# Patient Record
Sex: FEMALE | Race: WHITE | Employment: FULL TIME | URBAN - METROPOLITAN AREA
[De-identification: names, ages, dates, MRNs, and addresses within clinical notes are randomized per-mention and may not be internally consistent; named-entity substitution may affect disease eponyms.]

---

## 2020-06-03 ENCOUNTER — TELEPHONE (OUTPATIENT)
Dept: FAMILY MEDICINE CLINIC | Age: 45
End: 2020-06-03

## 2020-06-03 NOTE — TELEPHONE ENCOUNTER
Ms. Pieter Tucker is scheduled as a new patient for you Monday, June 8th, 2020. She has been moved a few times and held off with back pain that is progressively worse. She is preferring an in office visit as soon as possible. She doesn't feel a virtual visit with suffice. She has been seeing a chiropractor for the past three weeks but still needs medical care from pcp. She doesn't want to wait any longer. Please review and advise.

## 2020-06-03 NOTE — TELEPHONE ENCOUNTER
I appreciate her frustration. COVID has been challenging for us all. I'm afraid I cannot accommodate all elements of her request. I will not have in office appt availability any sooner than the last week of June.  Options are:    Virtual visit, which I can accommodate Friday  In person with me as above  In person with someone else sooner    NELI

## 2020-06-05 NOTE — TELEPHONE ENCOUNTER
Noted thanks. If there are elements of her care with the chiropractor she'd like me to review, perhaps we can facilitate records request between now and then.  NELI

## 2020-06-05 NOTE — TELEPHONE ENCOUNTER
Pt agreed to virtual for now.        Date/time Tuesday, June 16, 2020 03:30 PM  Patient  Marianne Carballo [birth year] (87WU F) #[id number]  [facility] OFFICE-PCP  Appt type Virtual Visit Special Case [phone]  Provider Delmy Castañeda

## 2020-06-05 NOTE — TELEPHONE ENCOUNTER
I called her. She is making the calls to get them sent here or printed for herself to drop off so you have time to review before appointment :)  Xray has been done too, so radiology reports should be included. English

## 2020-06-16 ENCOUNTER — VIRTUAL VISIT (OUTPATIENT)
Dept: FAMILY MEDICINE CLINIC | Age: 45
End: 2020-06-16

## 2020-06-16 DIAGNOSIS — E66.9 OBESITY, UNSPECIFIED CLASSIFICATION, UNSPECIFIED OBESITY TYPE, UNSPECIFIED WHETHER SERIOUS COMORBIDITY PRESENT: ICD-10-CM

## 2020-06-16 DIAGNOSIS — M47.26 OSTEOARTHRITIS OF SPINE WITH RADICULOPATHY, LUMBAR REGION: Primary | ICD-10-CM

## 2020-06-16 RX ORDER — MELATONIN 5 MG
5 CAPSULE ORAL
COMMUNITY
End: 2021-11-29

## 2020-06-16 RX ORDER — BISMUTH SUBSALICYLATE 262 MG
1 TABLET,CHEWABLE ORAL DAILY
COMMUNITY

## 2020-06-16 RX ORDER — MELOXICAM 15 MG/1
15 TABLET ORAL DAILY
Qty: 30 TAB | Refills: 1 | Status: SHIPPED | OUTPATIENT
Start: 2020-06-16 | End: 2020-09-24 | Stop reason: SDUPTHER

## 2020-06-16 NOTE — PROGRESS NOTES
Javier Hinkle is a 41 y.o. female who was seen by synchronous (real-time) audio-video technology on 6/16/2020. Consent: Javier Hinkle, who was seen by synchronous (real-time) audio-video technology, and/or her healthcare decision maker, is aware that this patient-initiated, Telehealth encounter on 6/16/2020 is a billable service, with coverage as determined by her insurance carrier. She is aware that she may receive a bill and has provided verbal consent to proceed: Yes. Assessment & Plan:     Diagnoses and all orders for this visit:    1. Osteoarthritis of spine with radiculopathy, lumbar region  -     meloxicam (MOBIC) 15 mg tablet; Take 1 Tab by mouth daily. 2. Obesity, unspecified classification, unspecified obesity type, unspecified whether serious comorbidity present      Not interested in procedural/surgical options  Discussed medication options, imaging, PT, continued observation given improvement of the past week. Agree on scheduled NSAIDs, reassess  Plan eval obesity to follow    Follow-up and Dispositions    · Return in about 1 month (around 7/16/2020) for back pain follow up. I spent at least 45 minutes on this visit with this new patient. [id number]  Subjective:   Javier Hinkle is a 41 y.o. female who was seen for LOW BACK PAIN    complains of left low back pain with rad'n to left hip/prox thigh. Initial onset approx April 2019. \"I thought I had tweaked it when we moved. \" episodic flares triggered by activity since then, overall relatively mild. Significantly worse, rapid decline with Teleworking. Had been walking 5-6 miles/day-> 1 mile. Sought chiropractic care x 3 weeks. Discharged with recommendation to seek medical care due to lack of progress. Notes from [address] [place] [address] Esme Payne DC (chirpactor's office) reviewed:  several visits May-June: left side low back pain with rad'n to left hip and thigh off and on x 1 year -> constant.  Worse with activity and prolonged sitting + left SLR, lumbar paraspinal tenderness and \"hypertonicity\". normal strength. Films show narrowing L5-S1, mild degen changes L4-L5    Manipulation and TENS, HEP. Modest improvement in the past week. Now walking about 2.5 miles/day without significant pain. No therapeutic trial NSAIDs (aborted when no relief with 1-2 days)    10year old son with allergies. Just found out she's to go back to work next week. Worries about putting him back in . Working on continued [employer] options. history of depression 15-20 years ago - not recurrent  No history of GI bleed    Would like to lose 60 lb - frustrated    Prior to Admission medications    Medication Sig Start Date End Date Taking? Authorizing Provider   multivitamin (ONE A DAY) tablet Take 1 Tab by mouth daily. Yes Provider, Historical   melatonin 5 mg cap capsule Take 5 mg by mouth nightly. Yes Provider, Historical     Allergies not on file        Review of Systems   Constitutional: Negative for fever and weight loss. Respiratory: Negative for shortness of breath. Cardiovascular: Negative for chest pain. Neurological: Negative for sensory change and focal weakness. No loss bowel or bladder control         Objective: There were no vitals taken for this visit. General: alert, cooperative, no distress, obese   Mental  status: normal mood, behavior, speech, dress, motor activity, and thought processes, able to follow commands   HENT: NCAT   Neck: no visualized mass   Resp: no respiratory distress   Neuro: no gross deficits   Skin: no discoloration or lesions of concern on visible areas   Psychiatric: normal affect, consistent with stated mood, no evidence of hallucinations     Additional exam findings: We discussed the expected course, resolution and complications of the diagnosis(es) in detail. Medication risks, benefits, costs, interactions, and alternatives were discussed as indicated.   I advised her to contact the office if her condition worsens, changes or fails to improve as anticipated. She expressed understanding with the diagnosis(es) and plan. Travon Skelton is a 41 y.o. female who was evaluated by a video visit encounter for concerns as above. Patient identification was verified prior to start of the visit. A caregiver was present when appropriate. Due to this being a TeleHealth encounter (During [facility] public health emergency), evaluation of the following organ systems was limited: Vitals/Constitutional/EENT/Resp/CV/GI//MS/Neuro/Skin/Heme-Lymph-Imm. Pursuant to the emergency declaration under the [facility], 1135 waiver authority and the [employer] Act, this Virtual  Visit was conducted, with patient's (and/or legal guardian's) consent, to reduce the patient's risk of exposure to COVID-19 and provide necessary medical care. Services were provided through a video synchronous discussion virtually to substitute for in-person clinic visit. Patient and provider were located at their individual homes.       Brock Barraza MD

## 2020-06-16 NOTE — PROGRESS NOTES
3:04 PM left message for patient asking her to call the office back as soon as possible to review her health information before her 3:30 appt with Dr. Polo Ovalle, office number left. Pain= back, left 4    New patient being seen today to establish care she c/o low left back pain x 1 year, she initially thought she pulled something while moving but says her pain has not resolved. She says since she has been working from home her back pain has gotten worse and seems to radiate to her left hip. She did 8 sessions with chiropractor who recommended she get this evaluated further. She has tried OTC Ibuprofen but says this did not work at all. Medication reconciliation has been completed with patient. Care team discussed/updated as well as pharmacy.     Health Maintenance Due   Topic Date Due    DTaP/Tdap/Td series (1 - Tdap) 04/14/1997    PAP AKA CERVICAL CYTOLOGY  04/14/1997    Lipid Screen  04/14/2016

## 2020-09-18 ENCOUNTER — TELEPHONE (OUTPATIENT)
Dept: FAMILY MEDICINE CLINIC | Age: 45
End: 2020-09-18

## 2020-09-18 DIAGNOSIS — M47.26 OSTEOARTHRITIS OF SPINE WITH RADICULOPATHY, LUMBAR REGION: ICD-10-CM

## 2020-09-18 NOTE — TELEPHONE ENCOUNTER
MsConsuelo Esperanza Kelley is calling stating that her back pain is still present. Medication helps but when she doesn't have it, it hurts. She also wanted to discuss scheduling routine care. Please call at [phone].

## 2020-09-18 NOTE — TELEPHONE ENCOUNTER
Patient was to follow up in one month for her back pain which she did not follow through, called patient no answer left message asking her to call the office back.

## 2020-09-24 RX ORDER — MELOXICAM 15 MG/1
15 TABLET ORAL DAILY
Qty: 30 TAB | Refills: 0 | Status: SHIPPED | OUTPATIENT
Start: 2020-09-24 | End: 2021-04-09 | Stop reason: ALTCHOICE

## 2020-09-24 NOTE — TELEPHONE ENCOUNTER
Was given written message from [facility] to call patient back, called patient  verified she has been scheduled for her back pain follow up on 10/6/20 at 1:30 PM. She is asking if the Mobic can be sent in for her, says she is out. Medication has been pended please review and sign if appropriate.

## 2020-10-06 ENCOUNTER — VIRTUAL VISIT (OUTPATIENT)
Dept: FAMILY MEDICINE CLINIC | Age: 45
End: 2020-10-06
Payer: COMMERCIAL

## 2020-10-06 DIAGNOSIS — F41.9 ANXIETY: ICD-10-CM

## 2020-10-06 DIAGNOSIS — M47.26 OSTEOARTHRITIS OF SPINE WITH RADICULOPATHY, LUMBAR REGION: Primary | ICD-10-CM

## 2020-10-06 DIAGNOSIS — J98.01 BRONCHOSPASM: ICD-10-CM

## 2020-10-06 DIAGNOSIS — F33.9 EPISODE OF RECURRENT MAJOR DEPRESSIVE DISORDER, UNSPECIFIED DEPRESSION EPISODE SEVERITY (HCC): ICD-10-CM

## 2020-10-06 PROCEDURE — 99214 OFFICE O/P EST MOD 30 MIN: CPT | Performed by: FAMILY MEDICINE

## 2020-10-06 RX ORDER — MINERAL OIL
180 ENEMA (ML) RECTAL DAILY
COMMUNITY

## 2020-10-06 RX ORDER — ALBUTEROL SULFATE 90 UG/1
2 AEROSOL, METERED RESPIRATORY (INHALATION)
Qty: 1 INHALER | Refills: 0 | Status: SHIPPED | OUTPATIENT
Start: 2020-10-06 | End: 2021-06-22 | Stop reason: SDUPTHER

## 2020-10-06 RX ORDER — CITALOPRAM 10 MG/1
10 TABLET ORAL DAILY
Qty: 30 TAB | Refills: 2 | Status: SHIPPED | OUTPATIENT
Start: 2020-10-06 | End: 2020-10-20 | Stop reason: SDUPTHER

## 2020-10-06 NOTE — PROGRESS NOTES
Patient being seen for VV back pain follow up. She says the Meloxicam does help but if she stops taking this her back pain will return in about 2 days. She says she has been having tightness in her chest for weeks not due to stress at work she was seen at [facility] for this prescribed Prilosec 20 mg she had taken this but it did not help so she stopped it. 1. Have you been to the ER, urgent care clinic since your last visit? Hospitalized since your last visit? Yes When: 10/3/20 [facility]: [facility] [facility] Reason for visit: chest pain  2. Have you seen or consulted any other health care providers outside of the [address] [place] [address] since your last visit? Include any pap smears or colon screening. No    Medication reconciliation has been completed with patient. Care team discussed/updated as well as pharmacy.     Health Maintenance Due   Topic Date Due    DTaP/Tdap/Td series (1 - Tdap) 04/14/1997    PAP AKA CERVICAL CYTOLOGY  04/14/1997    Lipid Screen  04/14/2016    Flu Vaccine (1) 09/01/2020

## 2020-10-06 NOTE — PATIENT INSTRUCTIONS
Using a Metered-Dose Inhaler: Care Instructions Your Care Instructions A metered-dose inhaler lets you breathe medicine into your lungs quickly. Inhaled medicine works faster than the same medicine in a pill. An inhaler allows you to take less medicine than you would need if you took it as a pill. \"Metered-dose\" means that the inhaler gives a measured amount of medicine each time you use it. A metered-dose inhaler gives medicine in the form of a liquid mist. 
Your doctor may want you to use a spacer with your inhaler. A spacer is a chamber that you attach to the inhaler. The chamber holds the medicine before you inhale it. That way, you can inhale the medicine in as many breaths as you need. Doctors recommend using a spacer with most metered-dose inhalers, especially those with corticosteroid medicines. Follow-up care is a key part of your treatment and safety. Be sure to make and go to all appointments, and call your doctor if you are having problems. It's also a good idea to know your test results and keep a list of the medicines you take. How can you care for yourself at home? To get started · Talk with your health care provider to be sure you are using your inhaler the right way. It might help if you practice using it in front of a mirror. Use the inhaler exactly as prescribed. · Check that you have the correct medicine. If you use more than one inhaler, put a label on each one. This will let you know which one to use at the right time. · Keep track of how much medicine is in the inhaler. Check the label to see how many doses are in the container. If you know how many puffs you can take, you can replace the inhaler before you run out. Ask your health care provider how you can keep track of how much medicine is left. · Talk to your health care provider about using a spacer with your inhaler. Spacers make it easier to get the medicine into your lungs.  You may need a spacer if you are using corticosteroid medicines. A spacer can also help if you have problems pressing the inhaler and breathing in at the same time. · If you are using a corticosteroid inhaler, gargle and rinse out your mouth with water after use. Do not swallow the water. Swallowing the water will increase the chance that the medicine will get into your bloodstream. This may make it more likely that you will have side effects. To use a spacer with an inhaler 1. Shake the inhaler. Remove the inhaler cap, and place the mouthpiece of the inhaler into the spacer. Check the inhaler instructions to see if you need to prime your inhaler before you use it. If it needs priming, follow the instructions on how to prime your inhaler. 2. Remove the cap from the spacer. 3. Hold the inhaler upright with the mouthpiece at the bottom. 4. Tilt your head back a little, and breathe out slowly and completely. 5. Place the spacer's mouthpiece in your mouth. 6. Press down on the inhaler to spray one puff of medicine into the spacer, and then start breathing in slowly. Wait to inhale until after you have pressed down on the inhaler. Some spacers have a whistle. If you hear it, you should breathe in more slowly. 7. Hold your breath for 10 seconds. This will let the medicine settle in your lungs. 8. If you need to take a second dose, wait 30 to 60 seconds to allow the inhaler valve to refill. To use an inhaler without a spacer 1. Shake the inhaler as directed. Remove the cap. Check the instructions to see if you need to prime your inhaler before you use it. If it needs priming, follow the instructions on how to prime your inhaler. 2. Hold the inhaler upright with the mouthpiece at the bottom. 3. Tilt your head back a little, and breathe out slowly and completely. 4. Position the inhaler in one of two ways: 
? You can place the inhaler in your mouth. This is easier for most people. And it lowers the risk that any of the medicine will get into your eyes. ? Or you can place the inhaler 1 to 2 inches in front of your open mouth, without closing your lips over it. Try to open your mouth as wide as you can. Placing the inhaler in front of your open mouth may be better for getting the medicine into your lungs. But some people may find this too hard to do. 5. Start taking slow, even breaths through your mouth. Press down on the inhaler once, then inhale fully. 6. Hold your breath for 10 seconds. This will let the medicine settle in your lungs. 7. If you need to take a second dose, wait 30 to 60 seconds to allow the inhaler valve to refill. Where can you learn more? Go to http://www.montoya.com/ Enter K111 in the search box to learn more about \"Using a Metered-Dose Inhaler: Care Instructions. \" Current as of: February 24, 2020               Content Version: 12.6 © 2006-2020 [employer]. Care instructions adapted under license by [employer] (which disclaims liability or warranty for this information). If you have questions about a medical condition or this instruction, always ask your healthcare professional. [place] [address] any warranty or liability for your use of this information.

## 2020-10-06 NOTE — PROGRESS NOTES
Kim Wu is a 41 y.o. female who was seen by synchronous (real-time) audio-video technology on 10/6/2020 for Back Pain and Chest Pain        Assessment & Plan:   Diagnoses and all orders for this visit:    1. Osteoarthritis of spine with radiculopathy, lumbar region  -     REFERRAL TO PHYSICAL THERAPY    2. Bronchospasm  -     albuterol (PROVENTIL HFA, VENTOLIN HFA, PROAIR HFA) 90 mcg/actuation inhaler; Take 2 Puffs by inhalation every four (4) hours as needed for Wheezing.  -     inhalational spacing device; 1 Each by Does Not Apply route as needed (chest tightness, shortness of breath). 3. Anxiety  -     citalopram (CELEXA) 10 mg tablet; Take 1 Tab by mouth daily. 4. Episode of recurrent major depressive disorder, unspecified depression episode severity (HCC)  -     citalopram (CELEXA) 10 mg tablet; Take 1 Tab by mouth daily. All uncontrolled  Low risk self harm      712  Subjective:   Seen 6/16 for left low back pain with rad'n to left hip/prox thigh. Films through chiropractor. Assessed OA spine with radiculopathy. Opted for NSAIDs alone. Relief while taking it. Returns without it. Still left low bac. Not radiating into hip and thigh since shifted to bike riding    Also complains of chest tightness. Seen UC. Was told it might be an esophageal spasm. No change with prescribed PPI. 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 Off and on x years, currently active x a month. Often able to relieve with self calming in the past. Prescribed hydroxyzine in the past to good effect albeit sedating. Celexa \"sounds familiar\"  Tearful describing stressors and past episodes years ago  \"I have my moments. \"    Anxiety and depression 18 years ago    Seasonal allergies in the fall. Controlled with antihistamines.     5 mile bike ride today  Sometimes has to stop at the top of the stairs to catch her breath    Prior to Admission medications    Medication Sig Start Date End Date Taking? Authorizing Provider   fexofenadine (ALLEGRA) 180 mg tablet Take 180 mg by mouth daily. Yes Provider, Historical   meloxicam (MOBIC) 15 mg tablet Take 1 Tab by mouth daily. 9/24/20  Yes Ronit Renteria MD   multivitamin (ONE A DAY) tablet Take 1 Tab by mouth daily. Yes Provider, Historical   melatonin 5 mg cap capsule Take 5 mg by mouth nightly. Provider, Historical         ROS    Objective:   No flowsheet data found. General: alert, cooperative, no distress   Mental  status: normal mood, behavior, speech, dress, motor activity, and thought processes, able to follow commands   HENT: NCAT   Neck: no visualized mass   Resp: no respiratory distress   Neuro: no gross deficits   Skin: no discoloration or lesions of concern on visible areas   Psychiatric: Tearful at times, broad affect, no evidence of hallucinations     Additional exam findings: We discussed the expected course, resolution and complications of the diagnosis(es) in detail. Medication risks, benefits, costs, interactions, and alternatives were discussed as indicated. I advised her to contact the office if her condition worsens, changes or fails to improve as anticipated. She expressed understanding with the diagnosis(es) and plan. Giulia Torres, who was evaluated through a patient-initiated, synchronous (real-time) audio-video encounter, and/or her healthcare decision maker, is aware that it is a billable service, with coverage as determined by her insurance carrier. She provided verbal consent to proceed: n/a- consent obtained within past 12 months, and patient identification was verified.  It was conducted pursuant to the emergency declaration under the [facility], 1135 waiver authority and the [employer] Supplemental Appropriations Act. A caregiver was present when appropriate. Ability to conduct physical exam was limited. I was at home. The patient was in her parked car.       Tyler Shah MD

## 2020-10-13 ENCOUNTER — HOSPITAL ENCOUNTER (OUTPATIENT)
Dept: PHYSICAL THERAPY | Age: 45
Discharge: HOME | End: 2020-10-13
Payer: COMMERCIAL

## 2020-10-13 PROCEDURE — 97110 THERAPEUTIC EXERCISES: CPT

## 2020-10-13 PROCEDURE — 97140 MANUAL THERAPY 1/> REGIONS: CPT

## 2020-10-13 PROCEDURE — 97162 PT EVAL MOD COMPLEX 30 MIN: CPT

## 2020-10-13 NOTE — PROGRESS NOTES
[facility]  [address]  [place], [address]  [phone] fax    Plan of Care/ Statement of Necessity for Physical Therapy Services    Patient name: Darren Camejo Start of Care: 10/13/2020   Referral source: Arabella Jensen MD : 1975    Medical Diagnosis: Low back pain [M54.5]  Payor: Ivania Sanchez / Plan: Jonnathan Barbosa HMO / Product Type: HMO /  Onset Date:2019 with exacerbation May 2020    Treatment Diagnosis: LBP   Prior Hospitalization: see medical history Provider#: [id number]   Medications: Verified on Patient summary List    Comorbidities: depression, anxiety   Prior Level of Function: functionally I with all actvities     The Plan of Care and following information is based on the information from the initial evaluation. Assessment/ key information: 42 y/o female presents with c/o LBP and pain into the left hip region that initially started in  after moving but was intermittent in nature. She reports in May of 2020 her pain became consistent and has not shown improvement. She reports receiving chiropractic care without long term benefit. She does report relief with medication but pain returns if she is not taking her meds. The pt demonstrates limited lumbar extension with pain but has not pain with other planes of AROM. Evaluation Complexity History MEDIUM  Complexity : 1-2 comorbidities / personal factors will impact the outcome/ POC ; Examination MEDIUM Complexity : 3 Standardized tests and measures addressing body structure, function, activity limitation and / or participation in recreation  ;Presentation MEDIUM Complexity : Evolving with changing characteristics  ; Clinical Decision Making MEDIUM Complexity : FOTO score of 26-74  Overall Complexity Rating: MEDIUM  Problem List: pain affecting function, decrease ROM, decrease strength, decrease ADL/ functional abilitiies, decrease activity tolerance and decrease flexibility/ joint mobility   Treatment Plan may include any combination of the following: Therapeutic exercise, Therapeutic activities, Neuromuscular re-education, Physical agent/modality, Manual therapy, Patient education, Self Care training and Other: Dry Needle PRN  Patient / Family readiness to learn indicated by: asking questions, trying to perform skills and interest  Persons(s) to be included in education: patient (P)  Barriers to Learning/Limitations: None  Patient Goal (s): To decrease the pain  Patient Self Reported Health Status: good  Rehabilitation Potential: good  Short Term Goals: To be accomplished in 1 weeks:   1. The pt will be I and compliant with HEP     Long Term Goals: To be accomplished in 4 weeks:   1. Improve FOTO score to 60 for improved ability for daily activities. 2. The pt will demonstrate 4+/5 left glute max MMT without pain increase to improve ability for daily tasks   3. The pt will report no difficulty with performing her usual work. 4. The pt will report no difficulty with going from sitting to standing. 5. The pt will demonstrate 4+/5 glute med MMT on the left to improve ability for gait. Frequency / Duration: Patient to be seen 2 times per week for 4 weeks. Patient/ Caregiver education and instruction: Diagnosis, prognosis, self care, activity modification and exercises   [x]  Plan of care has been reviewed with PTA Valli Favre, PT 10/13/2020 10:44 AM    ________________________________________________________________________    I certify that the above Therapy Services are being furnished while the patient is under my care. I agree with the treatment plan and certify that this therapy is necessary.     Physician's Signature:____________Date:_________TIME:________    [employer], Date and Time must be completed for valid certification **    Please sign and return to [facility] [address]  [address]  [place], [address]  [phone] fax

## 2020-10-13 NOTE — PROGRESS NOTES
PT DAILY TREATMENT NOTE 10-18    Patient Name: Aruna Finney  Date:10/13/2020  : 1975  [x]  Patient  Verified  Payor: Damaso Francois / Plan: [employer] / Product Type: HMO /    In time:10:05  Out time:10:41  Total Treatment Time (min): 36  Visit #: 1 of 8      Treatment Area: Low back pain [M54.5]    SUBJECTIVE  Pain Level (0-10 scale): 5  Any medication changes, allergies to medications, adverse drug reactions, diagnosis change, or new procedure performed?: [x] No    [] Yes (see summary sheet for update)  Subjective functional status/changes:   [] No changes reported       OBJECTIVE    Modality rationale:    Min Type Additional Details    [] Estim:  []Unatt       []IFC  []Premod                        []Other:  []w/ice   []w/heat  Position:  Location:    [] Estim: []Att    []TENS instruct  []NMES                    []Other:  []w/US   []w/ice   []w/heat  Position:  Location:    []  Traction: [] Cervical       []Lumbar                       [] Prone          []Supine                       []Intermittent   []Continuous Lbs:  [] before manual  [] after manual    []  Ultrasound: []Continuous   [] Pulsed                           []1MHz   []3MHz W/cm2:  Location:    []  Iontophoresis with dexamethasone         Location: [] Take home patch   [] In clinic    []  Ice     []  heat  []  Ice massage  []  Laser   []  Anodyne Position:  Location:    []  Laser with stim  []  Other:  Position:  Location:    []  Vasopneumatic Device Pressure:       [] lo [] med [] hi   Temperature: [] lo [] med [] hi   [] Skin assessment post-treatment:  []intact []redness- no adverse reaction    []redness  adverse reaction:     18 min [x]Eval                  []Re-Eval       10 min Therapeutic Exercise:  [] See flow sheet : HEP    Rationale: increase ROM and increase strength to improve the patients ability to perform ADL      8 min Manual Therapy:  TPR to left lumbar paraspinals, alignment check   Rationale: decrease pain, increase tissue extensibility and decrease trigger points to perform ADL            With   [] TE   [] TA   [] neuro   [] other: Patient Education: [x] Review HEP    [] Progressed/Changed HEP based on:   [] positioning   [] body mechanics   [] transfers   [] heat/ice application    [] other:      Other Objective/Functional Measures:       Pain Level (0-10 scale) post treatment: 5    ASSESSMENT/Changes in Function:      Patient will continue to benefit from skilled PT services to modify and progress therapeutic interventions, address functional mobility deficits, address ROM deficits, address strength deficits, analyze and address soft tissue restrictions, analyze and cue movement patterns, analyze and modify body mechanics/ergonomics and assess and modify postural abnormalities to attain remaining goals. [x]  See Plan of Care  []  See progress note/recertification  []  See Discharge Summary         Progress towards goals / Updated goals:  Short Term Goals: To be accomplished in 1 weeks:   1. The pt will be I and compliant with HEP   IE- issued and instructed in HEP  Long Term Goals: To be accomplished in 4 weeks:   1. Improve FOTO score to 60 for improved ability for daily activities. IE- 50   2. The pt will demonstrate 4+/5 left glute max MMT without pain increase to improve ability for daily tasks   IE- 4-/5   3. The pt will report no difficulty with performing her usual work. IE- Quite a bit of difficulty   4. The pt will report no difficulty with going from sitting to standing. IE- Quite a bit of difficulty. 5. The pt will demonstrate 4+/5 glute med MMT on the left to improve ability for gait.     IE- 4-/5         PLAN  []  Upgrade activities as tolerated     [x]  Continue plan of care  []  Update interventions per flow sheet       []  Discharge due to:_  []  Other:_      Ne Wise, PT 10/13/2020  10:38 AM    Future Appointments   Date Time Provider Faye Damon   10/20/2020  4:00 PM Tal Moreno MD [facility]   10/23/2020 11:30 AM Vincenzo Neves MD [facility] AM[facility]

## 2020-10-19 ENCOUNTER — HOSPITAL ENCOUNTER (OUTPATIENT)
Dept: PHYSICAL THERAPY | Age: 45
Discharge: HOME | End: 2020-10-19
Payer: COMMERCIAL

## 2020-10-19 PROCEDURE — 97112 NEUROMUSCULAR REEDUCATION: CPT

## 2020-10-19 PROCEDURE — 97110 THERAPEUTIC EXERCISES: CPT

## 2020-10-19 PROCEDURE — 97140 MANUAL THERAPY 1/> REGIONS: CPT

## 2020-10-19 NOTE — PROGRESS NOTES
PT DAILY TREATMENT NOTE 10-18    Patient Name: Nay Thomas  Date:10/19/2020  : 1975  [x]  Patient  Verified  Payor: Rufus Rosario / Plan: [address] [place] [address] [place] HMO / Product Type: HMO /    In time:12:00  Out time:12:38  Total Treatment Time (min): 38  Visit #: 2 of 8    Medicare/BCBS Only   Total Timed Codes (min):  38 1:1 Treatment Time:  38       Treatment Area: Low back pain [M54.5]    SUBJECTIVE  Pain Level (0-10 scale): 7/10  Any medication changes, allergies to medications, adverse drug reactions, diagnosis change, or new procedure performed?: [x] No    [] Yes (see summary sheet for update)  Subjective functional status/changes:   [] No changes reported  Pt reports increased pain due to going to the grocery store prior to today's treatment. OBJECTIVE    20 min Therapeutic Exercise:  [x] See flow sheet :   Rationale: increase ROM and increase strength to improve the patients ability to perform ADLs with increased ease. 10 min Neuromuscular Re-education:  [x]  See flow sheet :   Rationale: increase strength, improve coordination and increase proprioception  to improve the patients ability to perform ADLs. 8 min Manual Therapy:  DTM to left glute/piriformis and left QL. Rationale: decrease pain, increase ROM and increase tissue extensibility to improve tolerance to ADLs. With   [] TE   [] TA   [] neuro   [] other: Patient Education: [x] Review HEP    [] Progressed/Changed HEP based on:   [] positioning   [] body mechanics   [] transfers   [] heat/ice application    [] other:      Other Objective/Functional Measures: Initiated exercises as per flow  Sheet. Pain Level (0-10 scale) post treatment: 0/10    ASSESSMENT/Changes in Function: Pt demonstrates good form with all therapeutic exercises. Pt has decreased symptoms after performing therapeutic exercises and manual treatment.      Patient will continue to benefit from skilled PT services to modify and progress therapeutic interventions, address functional mobility deficits, address ROM deficits, address strength deficits, analyze and address soft tissue restrictions, analyze and cue movement patterns and analyze and modify body mechanics/ergonomics to attain remaining goals. []  See Plan of Care  []  See progress note/recertification  []  See Discharge Summary         Progress towards goals / Updated goals:  Short Term Goals: To be accomplished in 1 weeks:              1. The pt will be I and compliant with HEP              IE- issued and instructed in HEP   Current: met per patient report. 10/19/2020  Long Term Goals: To be accomplished in 4 weeks:              1. Improve FOTO score to 60 for improved ability for daily activities. IE- 50              2. The pt will demonstrate 4+/5 left glute max MMT without pain increase to improve ability for daily tasks              IE- 4-/5              3. The pt will report no difficulty with performing her usual work. IE- Quite a bit of difficulty              4. The pt will report no difficulty with going from sitting to standing. IE- Quite a bit of difficulty. 5. The pt will demonstrate 4+/5 glute med MMT on the left to improve ability for gait.                IE- 4-/5     PLAN  []  Upgrade activities as tolerated     [x]  Continue plan of care  []  Update interventions per flow sheet       []  Discharge due to:_  []  Other:_      Michael Edwards PTA 10/19/2020  12:02 PM    Future Appointments   Date Time Provider Faye Damon   10/20/2020  4:00 PM Patsy Adame MD [facility] AM[facility]   10/22/2020 11:15 AM Maria Elena Wagner, PTA [facility]   10/23/2020 11:30 AM Patsy Adame MD [facility] AM[facility]   10/26/2020 10:30 AM Maria Elena Wagner, PTA [facility]   10/29/2020 11:30 AM Negrita Palumbo, PT [facility]   11/2/2020 11:15 AM Maria Elena Wagner, PTA [facility]   11/5/2020 10:00 AM Ngerita Palumbo, PT [facility]   11/9/2020 10:45 [facility]

## 2020-10-20 ENCOUNTER — VIRTUAL VISIT (OUTPATIENT)
Dept: FAMILY MEDICINE CLINIC | Age: 45
End: 2020-10-20
Payer: COMMERCIAL

## 2020-10-20 DIAGNOSIS — J98.01 BRONCHOSPASM: ICD-10-CM

## 2020-10-20 DIAGNOSIS — F33.9 EPISODE OF RECURRENT MAJOR DEPRESSIVE DISORDER, UNSPECIFIED DEPRESSION EPISODE SEVERITY (HCC): Primary | ICD-10-CM

## 2020-10-20 DIAGNOSIS — F41.9 ANXIETY: ICD-10-CM

## 2020-10-20 DIAGNOSIS — M47.26 OSTEOARTHRITIS OF SPINE WITH RADICULOPATHY, LUMBAR REGION: ICD-10-CM

## 2020-10-20 PROCEDURE — 99214 OFFICE O/P EST MOD 30 MIN: CPT | Performed by: FAMILY MEDICINE

## 2020-10-20 RX ORDER — CITALOPRAM 10 MG/1
10 TABLET ORAL DAILY
Qty: 90 TAB | Refills: 4 | Status: SHIPPED | OUTPATIENT
Start: 2020-10-20

## 2020-10-20 RX ORDER — ACETAMINOPHEN 500 MG
TABLET ORAL
COMMUNITY
End: 2021-06-22

## 2020-10-20 NOTE — PATIENT INSTRUCTIONS
Please \"see\" me when you are due for a new order for albuterol or if: 
 
You are having respiratory symptoms (cough, wheezing, shortness of breath) 2x/week or more during the day time or 2x/month at night time, OR if you need to use your albuterol 2 or more times daily for 2 days in a row.

## 2020-10-20 NOTE — PROGRESS NOTES
Patient being seen for follow up on her anxiety and depression. No concerns. 1. Have you been to the ER, urgent care clinic since your last visit? Hospitalized since your last visit? No  2. Have you seen or consulted any other health care providers outside of the [address] [place] [address] since your last visit? Include any pap smears or colon screening.  Says she is doing PT

## 2020-10-20 NOTE — PROGRESS NOTES
Jhonny Calero is a 41 y.o. female who was seen by synchronous (real-time) audio-video technology on 10/20/2020 for Anxiety and Depression        Assessment & Plan:     Diagnoses and all orders for this visit:    1. Episode of recurrent major depressive disorder, unspecified depression episode severity (HCC)  -     citalopram (CELEXA) 10 mg tablet; Take 1 Tab by mouth daily. 2. Anxiety  -     citalopram (CELEXA) 10 mg tablet; Take 1 Tab by mouth daily. 3. Bronchospasm    4. Osteoarthritis of spine with radiculopathy, lumbar region      Significant improvement in MDD and ASHOK. Good support for situational factors. Not clear whether she has asthma at this point vs isolated episode. Low threshold dx. Parameters reviewed. Switch to supportive footwear - as if was running a marathon. Follow-up and Dispositions    · Return for back pain if persists post PT, bronchospasm if needed, anxiety/depression 1 year, sooner if desired. 712  Subjective:   FU MDD and ASHOK. SSRI initiated 10/6/20. No side effects.     3 most recent PHQ Screens 10/20/2020   Little interest or pleasure in doing things Not at all   Feeling down, depressed, irritable, or hopeless Not at all   Total Score PHQ 2 0   Trouble falling or staying asleep, or sleeping too much More than half the days   Feeling tired or having little energy Not at all   Poor appetite, weight loss, or overeating Not at all   Feeling bad about yourself - or that you are a failure or have let yourself or your family down Not at all   Trouble concentrating on things such as school, work, reading, or watching TV Not at all   Moving or speaking so slowly that other people could have noticed; or the opposite being so fidgety that others notice Not at all   Thoughts of being better off dead, or hurting yourself in some way Not at all   PHQ 9 Score 2   How difficult have these problems made it for you to do your work, take care of your home and get along with others Not difficult at all     Over the last 2 weeks, how often have you been bothered by the following problems? Feeling nervous, anxious or on edge?: Not at all  Not being able to stop or control worrying?: Not at all  ASHOK-2 Subtotal: 0    Still with \"significant\" work related anxiety due to a single employee who is \"passive aggressive and manipulative\" \"challenges everything\"  Still finds herself inappropriately emotional at time. Working with HR. Lengthy process. Her own boss is understanding and supportive. \"It's exhausting. \"    Fu bronchospasm: Albuterol effective: no need x few days. Back pain: PT initiated. NSAIDs not giving the same relief. Lots of standing. Wearing Crocs, which have worked well for her x years. Prior to Admission medications    Medication Sig Start Date End Date Taking? Authorizing Provider   acetaminophen (Tylenol Extra Strength) 500 mg tablet Take  by mouth every six (6) hours as needed for Pain. Yes Provider, Historical   fexofenadine (ALLEGRA) 180 mg tablet Take 180 mg by mouth daily. Yes Provider, Historical   citalopram (CELEXA) 10 mg tablet Take 1 Tab by mouth daily. 10/6/20  Yes Javier Arango MD   meloxicam (MOBIC) 15 mg tablet Take 1 Tab by mouth daily. 9/24/20  Yes Javier Arnago MD   multivitamin (ONE A DAY) tablet Take 1 Tab by mouth daily. Yes Provider, Historical   melatonin 5 mg cap capsule Take 5 mg by mouth nightly. Yes Provider, Historical   albuterol (PROVENTIL HFA, VENTOLIN HFA, PROAIR HFA) 90 mcg/actuation inhaler Take 2 Puffs by inhalation every four (4) hours as needed for Wheezing. 10/6/20   Javier Arango MD   inhalational spacing device 1 Each by Does Not Apply route as needed (chest tightness, shortness of breath).  10/6/20   Javier Arango MD         ROS    Objective:      General: alert, cooperative, no distress   Mental  status: normal mood, behavior, speech, dress, motor activity, and thought processes, able to follow commands HENT: NCAT   Neck: no visualized mass   Resp: no respiratory distress   Neuro: no gross deficits   Skin: no discoloration or lesions of concern on visible areas   Psychiatric: normal affect, consistent with stated mood, no evidence of hallucinations     Additional exam findings: We discussed the expected course, resolution and complications of the diagnosis(es) in detail. Medication risks, benefits, costs, interactions, and alternatives were discussed as indicated. I advised her to contact the office if her condition worsens, changes or fails to improve as anticipated. She expressed understanding with the diagnosis(es) and plan. Shree Samayoa, who was evaluated through a patient-initiated, synchronous (real-time) audio-video encounter, and/or her healthcare decision maker, is aware that it is a billable service, with coverage as determined by her insurance carrier. She provided verbal consent to proceed: n/a- consent obtained within past 12 months, and patient identification was verified. It was conducted pursuant to the emergency declaration under the [address] [place] [address] authority and the [place] [employer] [place] [employer] Act. A caregiver was present when appropriate. Ability to conduct physical exam was limited. I was at home. The patient was at home.       Izabela Ramírez MD

## 2020-10-22 ENCOUNTER — HOSPITAL ENCOUNTER (OUTPATIENT)
Dept: PHYSICAL THERAPY | Age: 45
Discharge: HOME | End: 2020-10-22
Payer: COMMERCIAL

## 2020-10-22 PROCEDURE — 97140 MANUAL THERAPY 1/> REGIONS: CPT

## 2020-10-22 PROCEDURE — 97110 THERAPEUTIC EXERCISES: CPT

## 2020-10-22 PROCEDURE — 97112 NEUROMUSCULAR REEDUCATION: CPT

## 2020-10-22 NOTE — PROGRESS NOTES
PT DAILY TREATMENT NOTE 10-18    Patient Name: Awa Pastor  Date:10/22/2020  : 1975  [x]  Patient  Verified  Payor: Isabel Radford / Plan: [employer] / Product Type: HMO /    In time:11:16  Out time:12:00  Total Treatment Time (min): 44  Visit #: 3 of 8    Treatment Area: Low back pain [M54.5]    SUBJECTIVE  Pain Level (0-10 scale): 4/10  Any medication changes, allergies to medications, adverse drug reactions, diagnosis change, or new procedure performed?: [x] No    [] Yes (see summary sheet for update)  Subjective functional status/changes:   [] No changes reported  Pt reports no new complaints. \"I was able to put patterns together for sewing this morning. \"    OBJECTIVE    26 min Therapeutic Exercise:  [x] See flow sheet :   Rationale: increase ROM and increase strength to improve the patients ability to perform ADLs with increased ease. 10 min Neuromuscular Re-education:  [x]  See flow sheet :   Rationale: increase strength, improve coordination and increase proprioception  to improve the patients ability to perform ADLs with increased ease. 8 min Manual Therapy:  DTM to left glute, piriformis, QL and lumbar paraspinals. Rationale: decrease pain, increase ROM and increase tissue extensibility to improve functional mobility. With   [] TE   [] TA   [] neuro   [] other: Patient Education: [x] Review HEP    [] Progressed/Changed HEP based on:   [] positioning   [] body mechanics   [] transfers   [] heat/ice application    [] other:      Other Objective/Functional Measures: added hattie stretch and core align exercises. Pain Level (0-10 scale) post treatment: 0/10    ASSESSMENT/Changes in Function: Pt has no increased symptoms with therapeutic exercises or manual treatment. Advised patient to apply modality such as heat or ice at home PRN for pain.      Patient will continue to benefit from skilled PT services to modify and progress therapeutic interventions, address functional mobility deficits, address ROM deficits, address strength deficits, analyze and address soft tissue restrictions, analyze and cue movement patterns and analyze and modify body mechanics/ergonomics to attain remaining goals. []  See Plan of Care  []  See progress note/recertification  []  See Discharge Summary         Progress towards goals / Updated goals:  Short Term Goals: To be accomplished in 1 weeks:              1. The pt will be I and compliant with HEP              IE- issued and instructed in HEP              Current: met per patient report. 10/19/2020  Long Term Goals: To be accomplished in 4 weeks:              1. Improve FOTO score to 60 for improved ability for daily activities.             QZ- 50              2. The pt will demonstrate 4+/5 left glute max MMT without pain increase to improve ability for daily tasks              SV- 4-/5              3. The pt will report no difficulty with performing her usual work.  Angelina Arrington- Quite a bit of difficulty  [id number]. The pt will report no difficulty with going from sitting to standing.              IE- Quite a bit of difficulty.             8. The pt will demonstrate 4+/5 glute med MMT on the left to improve ability for gait.                CP- 4-/5     PLAN  []  Upgrade activities as tolerated     [x]  Continue plan of care  []  Update interventions per flow sheet       []  Discharge due to:_  []  Other:_      Jerrell Donohue PTA 10/22/2020  11:22 AM    Future Appointments   Date Time Provider Fyae Damon   10/23/2020 11:30 AM Mauricio Fagan MD [facility] AM[facility]   10/26/2020 10:30 [facility]   10/29/2020 11:30 AM Regina Bullock, PT [facility]   11/2/2020 11:15 AM Yvon Negron PTA [facility]   11/5/2020 10:00 AM Regina Bullock, PT [facility]   11/9/2020 10:45 AM Vinicio Sue, PT [facility]

## 2020-10-23 ENCOUNTER — OFFICE VISIT (OUTPATIENT)
Dept: FAMILY MEDICINE CLINIC | Age: 45
End: 2020-10-23
Payer: COMMERCIAL

## 2020-10-23 ENCOUNTER — TELEPHONE (OUTPATIENT)
Dept: FAMILY MEDICINE CLINIC | Age: 45
End: 2020-10-23

## 2020-10-23 VITALS
RESPIRATION RATE: 14 BRPM | HEART RATE: 64 BPM | TEMPERATURE: 98.1 F | OXYGEN SATURATION: 98 % | DIASTOLIC BLOOD PRESSURE: 84 MMHG | WEIGHT: 197.6 LBS | SYSTOLIC BLOOD PRESSURE: 118 MMHG

## 2020-10-23 DIAGNOSIS — Z13.1 SCREENING FOR DIABETES MELLITUS: ICD-10-CM

## 2020-10-23 DIAGNOSIS — Z01.419 WELL WOMAN EXAM: Primary | ICD-10-CM

## 2020-10-23 DIAGNOSIS — Z11.4 SCREENING FOR HIV (HUMAN IMMUNODEFICIENCY VIRUS): ICD-10-CM

## 2020-10-23 DIAGNOSIS — Z13.220 SCREENING, LIPID: ICD-10-CM

## 2020-10-23 DIAGNOSIS — Z12.4 SCREENING FOR CERVICAL CANCER: ICD-10-CM

## 2020-10-23 DIAGNOSIS — Z11.59 NEED FOR HEPATITIS C SCREENING TEST: ICD-10-CM

## 2020-10-23 DIAGNOSIS — E66.9 OBESITY, UNSPECIFIED CLASSIFICATION, UNSPECIFIED OBESITY TYPE, UNSPECIFIED WHETHER SERIOUS COMORBIDITY PRESENT: ICD-10-CM

## 2020-10-23 PROCEDURE — 99396 PREV VISIT EST AGE 40-64: CPT | Performed by: FAMILY MEDICINE

## 2020-10-23 NOTE — PROGRESS NOTES
Patient being seen today for her well woman and pap collection, no other concerns. 1. Have you been to the ER, urgent care clinic since your last visit? Hospitalized since your last visit? No  2. Have you seen or consulted any other health care providers outside of the [address] [place] [address] since your last visit? Include any pap smears or colon screening. No    Medication reconciliation has been completed with patient. Care team discussed/updated as well as pharmacy. Health Maintenance Due   Topic Date Due    DTaP/Tdap/Td series (1 - Tdap) 04/14/1997    PAP AKA CERVICAL CYTOLOGY  04/14/1997    Lipid Screen  04/14/2016    Flu Vaccine (1) 09/01/2020     Health Maintenance reviewed - Had flu vaccine done at [facility].

## 2020-10-23 NOTE — PROGRESS NOTES
Ari Bray is a 41 y.o. female here for Well Woman Exam    Assessment/Plan:     Diagnoses and all orders for this visit:    1. Well woman exam    2. Screening for cervical cancer  -     PAP IG, APTIMA HPV AND RFX 16/18,45 ([id number]); Future    3. Screening, lipid  -     METABOLIC PANEL, COMPREHENSIVE; Future  -     LIPID PANEL W/ REFLX DIRECT LDL; Future    4. Screening for diabetes mellitus  -     HEMOGLOBIN A1C WITH EAG; Future  -     METABOLIC PANEL, COMPREHENSIVE; Future    5. Need for hepatitis C screening test  -     HEPATITIS C AB; Future    6. Screening for HIV (human immunodeficiency virus)  -     HIV 1/2 AG/AB, 4TH GENERATION,W RFLX CONFIRM; Future    7. Obesity, unspecified classification, unspecified obesity type, unspecified whether serious comorbidity present  -     TSH W/ REFLX FREE T4 IF ABNORMAL; Future        Decided to defer mmg to age 40       Follow-up and Dispositions    · Return in about 1 year (around 10/23/2021) for well woman, no pap, results via MyChart with plan to follow. Subjective:   Ari Bray is a 41 y.o. female here for a preventive care visit    Concerned about risk for DM given weight    Health Maintenance   Topic Date Due    DTaP/Tdap/Td series (1 - Tdap) 1997    PAP AKA CERVICAL CYTOLOGY  1997    Lipid Screen  2016    Flu Vaccine (1) 2020    Pneumococcal 0-64 years  Aged Out         Family History   Problem Relation Age of Onset    Heart Disease Paternal Grandfather     Diabetes Paternal Grandmother     Breast Cancer Neg Hx     Colon Cancer Neg Hx      History reviewed. No pertinent past medical history.   Past Surgical History:   Procedure Laterality Date    HX APPENDECTOMY      HX CARPAL TUNNEL RELEASE Left     HX  SECTION       Social History     Tobacco Use    Smoking status: Never Smoker    Smokeless tobacco: Never Used   Substance Use Topics    Alcohol use: Not on file           Pap hx: normal      Current Outpatient Medications   Medication Sig Dispense Refill    acetaminophen (Tylenol Extra Strength) 500 mg tablet Take  by mouth every six (6) hours as needed for Pain.  citalopram (CELEXA) 10 mg tablet Take 1 Tab by mouth daily. 90 Tab 4    fexofenadine (ALLEGRA) 180 mg tablet Take 180 mg by mouth daily.  albuterol (PROVENTIL HFA, VENTOLIN HFA, PROAIR HFA) 90 mcg/actuation inhaler Take 2 Puffs by inhalation every four (4) hours as needed for Wheezing. 1 Inhaler 0    inhalational spacing device 1 Each by Does Not Apply route as needed (chest tightness, shortness of breath). 1 Device 0    meloxicam (MOBIC) 15 mg tablet Take 1 Tab by mouth daily. 30 Tab 0    multivitamin (ONE A DAY) tablet Take 1 Tab by mouth daily.  melatonin 5 mg cap capsule Take 5 mg by mouth nightly. Allergies   Allergen Reactions    Beeswax Itching    Neosporin [Hydrocortisone] Rash and Itching                  ROS:  Feeling well. No dyspnea or chest pain on exertion. No abdominal pain, change in bowel habits, black or bloody stools. GYN ROS: normal menses, no abnormal bleeding, pelvic pain or discharge, no breast pain or new or enlarging lumps on self exam. No neurological complaints. Objective:     Visit Vitals  /84 (BP 1 Location: Left arm, BP Patient Position: Sitting)   Pulse 64   Temp 98.1 °F (36.7 °C) (Oral)   Resp 14   Wt 197 lb 9.6 oz (89.6 kg)   LMP 10/04/2020   SpO2 98%        The patient appears well, alert, oriented x 3, in no distress. ENT normal.  Neck supple. No adenopathy or thyromegaly. Lungs are clear, good air entry, no wheezes, rhonchi or rales. S1 and S2 normal, no murmurs, regular rate and rhythm. Abdomen soft without tenderness, guarding, mass or organomegaly. Extremities show no edema. Neurological is without focal findings.     BREAST EXAM: breasts appear normal, no suspicious masses, no skin or nipple changes or axillary nodes    PELVIC EXAM: normal external genitalia, vulva, vagina, cervix, uterus and adnexa      Disclaimer: The patient understands our medical plan. Alternatives have been explained and offered. All questions have been addressed. She is encouraged to employ the information provided in the after visit summary, which was reviewed. She is instructed to call the clinic if she has not been notified either by phone or through [address] with the results of her tests or with an appointment plan for any referrals within 1 week(s). No news is not good news; it's no news. The patient  is to call if her condition worsens or fails to improve or if significant side effects are experienced. Aspects of this note may have been generated using voice recognition software. Despite editing, unrecognized errors may occur.        Melyssa Houston MD

## 2020-10-23 NOTE — TELEPHONE ENCOUNTER
Have you been diagnosed with, tested for, or told that you are suspected of having COVID-19 (coronovirus)? No  Have you had a fever or taken medication to treat a fever in the past 72 hours? No   Have you had a cough, SOB, or flu-like symptoms within the past 3 days? No  Have you had direct contact with someone who tested positive for COVID-19 within the past 14 days? No  Do you have a household member with flu-like symptoms, including fever, cough, or SOB? No  Do you currently have flu-like symptoms including fever, cough, or SOB? No  Are you experiencing new loss of taste or smell?  No

## 2020-10-23 NOTE — PATIENT INSTRUCTIONS
A Healthy Lifestyle: Care Instructions  Your Care Instructions     A healthy lifestyle can help you feel good, stay at a healthy weight, and have plenty of energy for both work and play. A healthy lifestyle is something you can share with your whole family. A healthy lifestyle also can lower your risk for serious health problems, such as high blood pressure, heart disease, and diabetes. You can follow a few steps listed below to improve your health and the health of your family. Follow-up care is a key part of your treatment and safety. Be sure to make and go to all appointments, and call your doctor if you are having problems. It's also a good idea to know your test results and keep a list of the medicines you take. How can you care for yourself at home? · Do not eat too much sugar, fat, or fast foods. You can still have dessert and treats now and then. The goal is moderation. · Start small to improve your eating habits. Pay attention to portion sizes, drink less juice and soda pop, and eat more fruits and vegetables. ? Eat a healthy amount of food. A 3-ounce serving of meat, for example, is about the size of a deck of cards. Fill the rest of your plate with vegetables and whole grains. ? Limit the amount of soda and sports drinks you have every day. Drink more water when you are thirsty. ? Eat at least 5 servings of fruits and vegetables every day. It may seem like a lot, but it is not hard to reach this goal. A serving or helping is 1 piece of fruit, 1 cup of vegetables, or 2 cups of leafy, raw vegetables. Have an apple or some carrot sticks as an afternoon snack instead of a candy bar. Try to have fruits and/or vegetables at every meal.  · Make exercise part of your daily routine. You may want to start with simple activities, such as walking, bicycling, or slow swimming. Try to be active 30 to 60 minutes every day. You do not need to do all 30 to 60 minutes all at once.  For example, you can exercise 3 times a day for 10 or 20 minutes. Moderate exercise is safe for most people, but it is always a good idea to talk to your doctor before starting an exercise program.  · Keep moving. Sherre Nageotte the lawn, work in the garden, or Xcerion. Take the stairs instead of the elevator at work. · If you smoke, quit. People who smoke have an increased risk for heart attack, stroke, cancer, and other lung illnesses. Quitting is hard, but there are ways to boost your chance of quitting tobacco for good. ? Use nicotine gum, patches, or lozenges. ? Ask your doctor about stop-smoking programs and medicines. ? Keep trying. In addition to reducing your risk of diseases in the future, you will notice some benefits soon after you stop using tobacco. If you have shortness of breath or asthma symptoms, they will likely get better within a few weeks after you quit. · Limit how much alcohol you drink. Moderate amounts of alcohol (up to 2 drinks a day for men, 1 drink a day for women) are okay. But drinking too much can lead to liver problems, high blood pressure, and other health problems. Family health  If you have a family, there are many things you can do together to improve your health. · Eat meals together as a family as often as possible. · Eat healthy foods. This includes fruits, vegetables, lean meats and dairy, and whole grains. · Include your family in your fitness plan. Most people think of activities such as jogging or tennis as the way to fitness, but there are many ways you and your family can be more active. Anything that makes you breathe hard and gets your heart pumping is exercise. Here are some tips:  ? Walk to do errands or to take your child to school or the bus.  ? Go for a family bike ride after dinner instead of watching TV. Where can you learn more?   Go to http://www.gray.com/  Enter K398 in the search box to learn more about \"A Healthy Lifestyle: Care Instructions. \"  Current as of: January 31, 2020               Content Version: 12.6  © 2151-9973 [employer]. Care instructions adapted under license by [employer] (which disclaims liability or warranty for this information). If you have questions about a medical condition or this instruction, always ask your healthcare professional. [place] [address] any warranty or liability for your use of this information.

## 2020-10-24 LAB
A-G RATIO,AGRAT: 2.2 RATIO (ref 1.1–2.6)
ALBUMIN SERPL-MCNC: 4.7 G/DL (ref 3.5–5)
ALP SERPL-CCNC: 60 U/L (ref 25–115)
ALT SERPL-CCNC: 17 U/L (ref 5–40)
ANION GAP SERPL CALC-SCNC: 14 MMOL/L (ref 3–15)
AST SERPL W P-5'-P-CCNC: 17 U/L (ref 10–37)
AVG GLU, 10930: 107 MG/DL (ref 91–123)
BILIRUB SERPL-MCNC: 0.3 MG/DL (ref 0.2–1.2)
BUN SERPL-MCNC: 19 MG/DL (ref 6–22)
CALCIUM SERPL-MCNC: 9.2 MG/DL (ref 8.4–10.5)
CHLORIDE SERPL-SCNC: 105 MMOL/L (ref 98–110)
CHOLEST SERPL-MCNC: 210 MG/DL (ref 110–200)
CO2 SERPL-SCNC: 19 MMOL/L (ref 20–32)
CREAT SERPL-MCNC: 0.6 MG/DL (ref 0.5–1.2)
GFRAA, 66117: >60
GFRNA, 66118: >60
GLOBULIN,GLOB: 2.1 G/DL (ref 2–4)
GLUCOSE SERPL-MCNC: 80 MG/DL (ref 70–99)
HBA1C MFR BLD HPLC: 5.4 % (ref 4.8–5.6)
HCV AB SER IA-ACNC: NORMAL
HDLC SERPL-MCNC: 3.9 MG/DL (ref 0–5)
HDLC SERPL-MCNC: 54 MG/DL
HIV -1/0/2 AG/AB WITH REFLEX, 13463: NON REACTIVE
HIV 1 & 2 AB SER-IMP: NORMAL
LDL/HDL RATIO,LDHD: 2.2
LDLC SERPL CALC-MCNC: 119 MG/DL (ref 50–99)
NON-HDL CHOLESTEROL, 011976: 156 MG/DL
POTASSIUM SERPL-SCNC: 3.9 MMOL/L (ref 3.5–5.5)
PROT SERPL-MCNC: 6.8 G/DL (ref 6.4–8.3)
SODIUM SERPL-SCNC: 138 MMOL/L (ref 133–145)
TRIGL SERPL-MCNC: 183 MG/DL (ref 40–149)
TSH SERPL DL<=0.005 MIU/L-ACNC: 1.15 MCU/ML (ref 0.27–4.2)
VLDLC SERPL CALC-MCNC: 37 MG/DL (ref 8–30)

## 2020-10-26 ENCOUNTER — HOSPITAL ENCOUNTER (OUTPATIENT)
Dept: PHYSICAL THERAPY | Age: 45
Discharge: HOME | End: 2020-10-26
Payer: COMMERCIAL

## 2020-10-26 PROCEDURE — 97110 THERAPEUTIC EXERCISES: CPT

## 2020-10-26 PROCEDURE — 97140 MANUAL THERAPY 1/> REGIONS: CPT

## 2020-10-26 PROCEDURE — 97112 NEUROMUSCULAR REEDUCATION: CPT

## 2020-10-26 NOTE — PROGRESS NOTES
PT DAILY TREATMENT NOTE 10-18    Patient Name: Jani Ken  Date:10/26/2020  : 1975  [x]  Patient  Verified  Payor: Dylan Fine / Plan: [place] [employer] / Product Type: HMO /    In time:10:30  Out time:11:12  Total Treatment Time (min): 42  Visit #: 4 of 8    Treatment Area: Low back pain [M54.5]    SUBJECTIVE  Pain Level (0-10 scale): 4-5/10  Any medication changes, allergies to medications, adverse drug reactions, diagnosis change, or new procedure performed?: [x] No    [] Yes (see summary sheet for update)  Subjective functional status/changes:   [] No changes reported  Pt reports she has a neuroma on her right foot that recently has been more irritated than it has been in years. Pt reports she had a calf cramp on her left side early  morning that woke her up out of her sleep. OBJECTIVE    24 min Therapeutic Exercise:  [x] See flow sheet :   Rationale: increase ROM and increase strength to improve the patients ability to perform ADLs with increased ease. 10 min Neuromuscular Re-education:  [x]  See flow sheet :   Rationale: increase strength, improve coordination and increase proprioception  to improve the patients ability to perform ADLs with increased ease. 8 min Manual Therapy:  DTM/MFR to left QL, lumbar paraspinals. Rationale: decrease pain, increase ROM and increase tissue extensibility to improve functional mobility. With   [] TE   [] TA   [] neuro   [] other: Patient Education: [x] Review HEP    [] Progressed/Changed HEP based on:   [] positioning   [] body mechanics   [] transfers   [] heat/ice application    [] other:      Other Objective/Functional Measures: Initiated prayer stretch. Pain Level (0-10 scale) post treatment: 2-3    ASSESSMENT/Changes in Function: Pt demonstrates improved control with core stability exercises. Pt has slight decrease in pain post treatment.      Patient will continue to benefit from skilled PT services to modify and progress therapeutic interventions, address functional mobility deficits, address ROM deficits, address strength deficits, analyze and address soft tissue restrictions, analyze and cue movement patterns and analyze and modify body mechanics/ergonomics to attain remaining goals. []  See Plan of Care  []  See progress note/recertification  []  See Discharge Summary         Progress towards goals / Updated goals:  Short Term Goals: To be accomplished in 1 weeks:              1. The pt will be I and compliant with HEP              IE- issued and instructed in HEP              Current: met per patient report. 10/19/2020  Long Term Goals: To be accomplished in 4 weeks:              1. Improve FOTO score to 60 for improved ability for daily activities.             ID- 50              2. The pt will demonstrate 4+/5 left glute max MMT without pain increase to improve ability for daily tasks              FB- 4-/5              3. The pt will report no difficulty with performing her usual work.  Lydia Dubs- Quite a bit of difficulty   Current: Pt reports continued difficulty performing work related activities such as walking.  21 . The pt will report no difficulty with going from sitting to standing.              IE- Quite a bit of difficulty.             6. The pt will demonstrate 4+/5 glute med MMT on the left to improve ability for gait.                KK- 4-/5        PLAN  []  Upgrade activities as tolerated     [x]  Continue plan of care  []  Update interventions per flow sheet       []  Discharge due to:_  []  Other:_      Quincy Cooks, SHRUTHI 10/26/2020  10:36 AM    Future Appointments   Date Time Provider Faye Damon   10/29/2020 11:30 AM Marylene Rocks, PT [facility]   11/2/2020 11:15 AM Christine Escoto PTA [facility]   11/5/2020 10:00 [facility]   11/9/2020 10:45 AM [facility]   10/22/2021 11:30 AM Ronit Renteria MD [facility] BS AMB

## 2020-10-27 NOTE — PROGRESS NOTES
Please call patient and notify of normal results with no sign of diabetes (a particular worry for her). Still waiting for pap results. Thank you.  NELI

## 2020-10-28 LAB
HPV HIGH RISK, 507303: NEGATIVE
PAP IMAGE GUIDED, 8900296: NORMAL

## 2020-10-28 NOTE — PROGRESS NOTES
Patient called the office back  verified she has been given results told as soon as we get pap results back I will call with those.

## 2020-10-29 ENCOUNTER — HOSPITAL ENCOUNTER (OUTPATIENT)
Dept: PHYSICAL THERAPY | Age: 45
Discharge: HOME | End: 2020-10-29
Payer: COMMERCIAL

## 2020-10-29 PROCEDURE — 97140 MANUAL THERAPY 1/> REGIONS: CPT

## 2020-10-29 PROCEDURE — 20560 NDL INSJ W/O NJX 1 OR 2 MUSC: CPT

## 2020-10-29 PROCEDURE — 97110 THERAPEUTIC EXERCISES: CPT

## 2020-10-29 NOTE — PROGRESS NOTES
PT DAILY TREATMENT NOTE 10-18    Patient Name: Ari Bray  Date:10/29/2020  : 1975  [x]  Patient  Verified  Payor: Shweta Alonzo / Plan: [place] [employer] / Product Type: HMO /    In time:1130  Out time:1223  Total Treatment Time (min): 48  Visit #: 5 of 8      Treatment Area: Low back pain [M54.5]    SUBJECTIVE  Pain Level (0-10 scale): 3  Any medication changes, allergies to medications, adverse drug reactions, diagnosis change, or new procedure performed?: [x] No    [] Yes (see summary sheet for update)  Subjective functional status/changes:   [] No changes reported  The pt reports diminished pain level today and feels her pain is not as widespread as before.      OBJECTIVE    Modality rationale: decrease inflammation and decrease pain to improve the patients ability to perform ADL   Min Type Additional Details    [] Estim:  []Unatt       []IFC  []Premod                        []Other:  []w/ice   []w/heat  Position:  Location:    [] Estim: []Att    []TENS instruct  []NMES                    []Other:  []w/US   []w/ice   []w/heat  Position:  Location:    []  Traction: [] Cervical       []Lumbar                       [] Prone          []Supine                       []Intermittent   []Continuous Lbs:  [] before manual  [] after manual    []  Ultrasound: []Continuous   [] Pulsed                           []1MHz   []3MHz W/cm2:  Location:    []  Iontophoresis with dexamethasone         Location: [] Take home patch   [] In clinic   10 [x]  Ice     []  heat  []  Ice massage  []  Laser   []  Anodyne Position:prone  Location: lumbar/glute left    []  Laser with stim  []  Other:  Position:  Location:    []  Vasopneumatic Device Pressure:       [] lo [] med [] hi   Temperature: [] lo [] med [] hi   [] Skin assessment post-treatment:  []intact []redness- no adverse reaction    []redness  adverse reaction:     10 min Therapeutic Exercise:  [x] See flow sheet :   Rationale: increase ROM and increase strength to improve the patients ability to perform ADL    30 min Manual Therapy:  IMDN to include education, assessment, set-up, palpation, hemostasis, STM/stretch to treated areas and reassessment only     Rationale: decrease pain, increase ROM, increase tissue extensibility and decrease trigger points to improve ability for daily tasks. 3 min Dry Needling:   [x]  CPT 86225:  needle insertion(s) without injection(s); 1 or 2 muscle(s)  []  CPT 21938:  needle insertion(s) without injection(s); 3 or more muscles. Rationale: decrease pain, increase tissue extensibility and decrease trigger points to improve ability for daily activities    Dry Needling Procedure Note    Procedure: An intramuscular manual therapy (dry needling) and a neuro-muscular re-education treatment was done to deactivate myofascial trigger points with a 30 gauge filament needle under aseptic technique. Indications:  [x] Myofascial pain and dysfunction [] Muscled spasms  [x] Myalgia/myositis   [] Muscle cramps  [] Muscle imbalances  [] Other:    Chart reviewed for the following:  Dylan JACOBSON PT, have reviewed the medical history, summary list and precautions/contraindications for [employer].   TIME OUT performed immediately prior to start of procedure:  Dylan JACOBSON PT, have performed the following reviews on [employer] prior to the start of the session:      [x] Verified patient identification by name and date of birth    [x] Agreement on all muscles being treated was verified   [x] Purpose of dry needling, side effects, possible complications, risks and benefits were explained to the patient   [x] Procedure site(s) verified  [x] Patient was positioned for comfort and draped for privacy  [x] Informed Consent was signed (initial visit) and verified verbally (subsequent visits)  [x] Patient was instructed on the need to report the use of blood thinners and/or immunosuppressant medications  [x] How to respond to possible adverse effects of treatment  [x] Self treatment of post needling soreness: ice, heat (moist heat, heat wraps) and stretching  [x] Opportunity was given to ask any questions, all questions were answered            Time: 11:50  Date of procedure: 10/29/2020    Treatment: The following muscles were treated today with intramuscular dry needling  [] Left [] Right Abdominals: Ant Rectus Abdominis  [] Left [] Right External Oblique / Internal Oblique / Transverse Abdominis  [] Left [] Right Thoracic Multifidi / Ke List  [] Left [] Right Iliocostalis Geanie Gulling / Silva Dura  [x] Left [] Right Longissimus Lumborum  [x] Left [] Right Lumbar Multifidi  [] Left [] Right Serratus Posterior Inferior  [] Left [] Right Quadratus Lumborum  [] Left [] Right Psoas  [] Left [] Right Iliacus  [] Left [] Right Iliopsoas (inguinal)  [] Left [] Right Piriformis  [] Left [] Right Quadratus Femoris  [x] Left [] Right Racheal Edwards / Melvina Mendoza   [] Left [] Right Obturator Internus  [] Left [] Right Obturator Externus / Debara Rhymes / Inferior Gemellus    [] Left [] Right Tensor Fasciae Sara  [] Left [] Right Iliotibial Band  [] Left [] Right Pectineus  [] Left [] Right Sartorius  [] Left [] Right Gracilis  [] Left [] Right Adductor Brevis / Adductor Longus / Adductor Tacey Cost  [] Left [] Right Rectus Femoris / Vastus Lateralis / Vastus Intermedius / Vastus Medialis Obliquus  [] Left [] Right Tibialis Anterior / Posterior  [] Left [] Right Extensor Digitorum Longus / Brevis  [] Left [] Right Extensor Hallucis Longus / Brevis  [] Left [] Right Hamstrings: Biceps Femoris / Ollen Stands / Semimembranosis  [] Left [] Right Popliteus / Planteris  [] Left [] Right Gastrocnemius Medial / Lateral  [] Left [] Right Soleus  [] Left [] Right Peronei: Longus / Rg Pila / Tertius  [] Left [] Right Flexor Digitorum Longus / Brevis  [] Left [] Right Flexor Hallucis Longus / Brevis  [] Left [] Right Quadratus Plantae  [] Left [] Right Abductor Digiti Minimi  [] Left [] Right Foot Interossei  [] Left [] Right Other:    Patient's response to today's treatment:  [x] Latent Twitch Response  [] Muscle relaxation [] Pain Relief  [] Post needling soreness [x] without complications  [] Increased Range of Motion  [] Other:     Performed by: Ne Wise, PT                With   [] TE   [] TA   [] neuro   [] other: Patient Education: [x] Review HEP    [] Progressed/Changed HEP based on:   [] positioning   [] body mechanics   [] transfers   [] heat/ice application    [] other:      Other Objective/Functional Measures: Performed DN trial     Pain Level (0-10 scale) post treatment: 1-2    ASSESSMENT/Changes in Function: The pt tolerated DN well today and reported diminished pain post treatment. Overall progress has been slow with PT. PT will monitor response to DN for possible future DN treatments. Patient will continue to benefit from skilled PT services to modify and progress therapeutic interventions, address functional mobility deficits, address ROM deficits, address strength deficits, analyze and address soft tissue restrictions, analyze and cue movement patterns and analyze and modify body mechanics/ergonomics to attain remaining goals. []  See Plan of Care  []  See progress note/recertification  []  See Discharge Summary         Progress towards goals / Updated goals:  Short Term Goals: To be accomplished in 1 weeks:              1. The pt will be I and compliant with HEP              IE- issued and instructed in HEP              Current: met per patient report. 10/19/2020  Long Term Goals: To be accomplished in 4 weeks:              1. Improve FOTO score to 60 for improved ability for daily activities.             IL- 50              2. The pt will demonstrate 4+/5 left glute max MMT without pain increase to improve ability for daily tasks              IE- 4-/5   Current: 4-/5 10-29-20              3.  The pt will report no difficulty with performing her usual work.  John Prasad- Quite a bit of difficulty              Current: Pt reports continued difficulty performing work related activities such as walking.  [id number]. The pt will report no difficulty with going from sitting to standing.              IE- Quite a bit of difficulty.             2. The pt will demonstrate 4+/5 glute med MMT on the left to improve ability for gait.                HS- 4-/5    Current: progressing 4/5 10-29-20     PLAN  []  Upgrade activities as tolerated     [x]  Continue plan of care  []  Update interventions per flow sheet       []  Discharge due to:_  []  Other:_      Eugenio Rodriguez, PT 10/29/2020  11:39 AM    Future Appointments   Date Time Provider Faye Tesfayei   11/2/2020 11:15 AM Harshad Jones PTA [facility]   11/5/2020 10:00 AM Dariusz Haskins, PT [facility]   11/9/2020 10:45 AM Dariusz Haskins, PT [facility]   10/22/2021 11:30 AM Faby Spear MD [facility] BS AMB

## 2020-11-02 ENCOUNTER — HOSPITAL ENCOUNTER (OUTPATIENT)
Dept: PHYSICAL THERAPY | Age: 45
Discharge: HOME | End: 2020-11-02
Payer: COMMERCIAL

## 2020-11-02 PROCEDURE — 97112 NEUROMUSCULAR REEDUCATION: CPT

## 2020-11-02 PROCEDURE — 97110 THERAPEUTIC EXERCISES: CPT

## 2020-11-02 PROCEDURE — 97140 MANUAL THERAPY 1/> REGIONS: CPT

## 2020-11-02 NOTE — PROGRESS NOTES
Patient called the office back  verified she has been made aware of her results and 5 year follow up for pap told to come in as needed for any other gyn issues, abn bleeding, pelvic pain and or vaginal discharge patient has expressed understanding.

## 2020-11-02 NOTE — PROGRESS NOTES
PT DAILY TREATMENT NOTE 11    Patient Name: Radha Matthews  Date:2020  : 1975  [x]  Patient  Verified  Payor: Laverne Rdz / Plan: [place] [employer] / Product Type: HMO /    In time:11:15  Out time:11:54  Total Treatment Time (min): 39  Visit #: 6 of 8    Medicare/BCBS Only   Total Timed Codes (min):  39 1:1 Treatment Time:  39       Treatment Area: Low back pain [M54.5]    SUBJECTIVE  Pain Level (0-10 scale): 10  Any medication changes, allergies to medications, adverse drug reactions, diagnosis change, or new procedure performed?: [x] No    [] Yes (see summary sheet for update)  Subjective functional status/changes:   [] No changes reported  Pt reports no new complaints of pain. Pt reports compliance with HEP. OBJECTIVE      11 min Therapeutic Exercise:  [x] See flow sheet :   Rationale: increase ROM and increase strength to improve the patients ability to perform ADLs with increased ease. 10 min Neuromuscular Re-education:  []  See flow sheet :   Rationale: increase strength, improve coordination and increase proprioception  to improve the patients ability to perform ADLs with increased ease. 8 min Manual Therapy:  DTM to left QL and lumbar paraspinals. The manual therapy interventions were performed at a separate and distinct time from the therapeutic activities interventions. Rationale: decrease pain, increase ROM and increase tissue extensibility to improve functional           With   [] TE   [] TA   [] neuro   [] other: Patient Education: [x] Review HEP    [] Progressed/Changed HEP based on:   [] positioning   [] body mechanics   [] transfers   [] heat/ice application    [] other:      Other Objective/Functional Measures: increased reps as per flow sheet. Pain Level (0-10 scale) post treatment: 1/10    ASSESSMENT/Changes in Function: Pt demonstrates decreased trigger points through left lumbar paraspinals and QL. Pt demonstrates good functional mobility.      Patient will continue to benefit from skilled PT services to modify and progress therapeutic interventions, address functional mobility deficits, address ROM deficits, address strength deficits, analyze and address soft tissue restrictions, analyze and cue movement patterns and analyze and modify body mechanics/ergonomics to attain remaining goals. []  See Plan of Care  []  See progress note/recertification  []  See Discharge Summary         Progress towards goals / Updated goals:  Short Term Goals: To be accomplished in 1 weeks:              1. The pt will be I and compliant with HEP              IE- issued and instructed in HEP              Current: met per patient report. 10/19/2020  Long Term Goals: To be accomplished in 4 weeks:              1. Improve FOTO score to 60 for improved ability for daily activities.             WA- 50              2. The pt will demonstrate 4+/5 left glute max MMT without pain increase to improve ability for daily tasks              IE- 4-/5              Current: 4-/5 10-29-20              3. The pt will report no difficulty with performing her usual work.  Zadie Puls- Quite a bit of difficulty              [id number]: Pt reports continued difficulty performing work related activities such as walking.  [id number]. The pt will report no difficulty with going from sitting to standing.              IE- Quite a bit of difficulty.             9. The pt will demonstrate 4+/5 glute med MMT on the left to improve ability for gait.                TR- 4-/5               Current: progressing 4/5 10-29-20     PLAN  []  Upgrade activities as tolerated     [x]  Continue plan of care  []  Update interventions per flow sheet       []  Discharge due to:_  []  Other:_      Prabhakar Neely, SHRUTHI 11/2/2020  11:25 AM    Future Appointments   Date Time Provider Faye Damon   11/5/2020 10:00 AM [facility]   11/9/2020 10:45 AM [facility]   10/22/2021 11:30 AM Megan Jones MD [facility] AM[facility]

## 2020-11-05 ENCOUNTER — HOSPITAL ENCOUNTER (OUTPATIENT)
Dept: PHYSICAL THERAPY | Age: 45
Discharge: HOME | End: 2020-11-05
Payer: COMMERCIAL

## 2020-11-05 PROCEDURE — 97112 NEUROMUSCULAR REEDUCATION: CPT

## 2020-11-05 PROCEDURE — 97140 MANUAL THERAPY 1/> REGIONS: CPT

## 2020-11-05 PROCEDURE — 97110 THERAPEUTIC EXERCISES: CPT

## 2020-11-05 NOTE — PROGRESS NOTES
PT DAILY TREATMENT NOTE     Patient Name: Concha Márquez  Date:2020  : 1975  [x]  Patient  Verified  Payor: Janay Weaver / Plan: [employer] / Product Type: HMO /    In time:1000  Out time:1058  Total Treatment Time (min): 62  Visit #: 7 of 8      Treatment Area: Low back pain [M54.5]    SUBJECTIVE  Pain Level (0-10 scale): 3-4  Any medication changes, allergies to medications, adverse drug reactions, diagnosis change, or new procedure performed?: [x] No    [] Yes (see summary sheet for update)  Subjective functional status/changes:   [] No changes reported  The pt reports her pain seems to fluctuate. She is unsure if DN was beneficial.     OBJECTIVE    Modality rationale: decrease inflammation and decrease pain to improve the patients ability to perform functional tasks.     Min Type Additional Details    [] Estim:  []Unatt       []IFC  []Premod                        []Other:  []w/ice   []w/heat  Position:  Location:    [] Estim: []Att    []TENS instruct  []NMES                    []Other:  []w/US   []w/ice   []w/heat  Position:  Location:    []  Traction: [] Cervical       []Lumbar                       [] Prone          []Supine                       []Intermittent   []Continuous Lbs:  [] before manual  [] after manual    []  Ultrasound: []Continuous   [] Pulsed                           []1MHz   []3MHz W/cm2:  Location:    []  Iontophoresis with dexamethasone         Location: [] Take home patch   [] In clinic   10 [x]  Ice     []  heat  []  Ice massage  []  Laser   []  Anodyne Position:prone  Location:lumbar    []  Laser with stim  []  Other:  Position:  Location:    []  Vasopneumatic Device Pressure:       [] lo [] med [] hi   Temperature: [] lo [] med [] hi   [] Skin assessment post-treatment:  []intact []redness- no adverse reaction    []redness  adverse reaction:     24 min Therapeutic Exercise:  [x] See flow sheet :   Rationale: increase ROM and increase strength to improve the patients ability to perform functional tasks. 16 min Neuromuscular Re-education:  [x]  See flow sheet : Core Align, Trap bar, QP stabs   Rationale: increase strength, improve coordination, improve balance and increase proprioception  to improve the patients ability to perform functional tasks. 8 min Manual Therapy:  Graston with GT5 to left lower lumbar paraspinals and left QL with pt prone   The manual therapy interventions were performed at a separate and distinct time from the therapeutic activities interventions. Rationale: decrease pain, increase ROM, increase tissue extensibility, decrease edema , correct positional vertigo and decrease trigger points to perform functional tasks. With   [] TE   [] TA   [] neuro   [] other: Patient Education: [x] Review HEP    [] Progressed/Changed HEP based on:   [] positioning   [] body mechanics   [] transfers   [] heat/ice application    [] other:      Other Objective/Functional Measures:  Performed Graston trial     Pain Level (0-10 scale) post treatment: 2    ASSESSMENT/Changes in Function: The pt with slow progress with PT. Performed Graston trial today and will assess response next session. She was able to progress with lumbar stability today. Patient will continue to benefit from skilled PT services to modify and progress therapeutic interventions, address functional mobility deficits, address ROM deficits, address strength deficits and analyze and address soft tissue restrictions to attain remaining goals. []  See Plan of Care  []  See progress note/recertification  []  See Discharge Summary         Progress towards goals / Updated goals:  Short Term Goals: To be accomplished in 1 weeks:              1. The pt will be I and compliant with HEP              IE- issued and instructed in HEP              Current: met per patient report. 10/19/2020  Long Term Goals: To be accomplished in 4 weeks:              1.  Improve FOTO score to 60 for improved ability for daily activities.             AS- 50              2. The pt will demonstrate 4+/5 left glute max MMT without pain increase to improve ability for daily tasks              IE- 4-/5              Current: 4-/5 10-29-20              3. The pt will report no difficulty with performing her usual work.  Alicja Dentoncher- Quite a bit of difficulty              [id number]: Pt reports continued difficulty performing work related activities such as walking.  [id number]. The pt will report no difficulty with going from sitting to standing.              IE- Quite a bit of difficulty.             6. The pt will demonstrate 4+/5 glute med MMT on the left to improve ability for gait.                XF- 4-/5               CCAIRHW: progressing 4/5 10-29-20        PLAN  []  Upgrade activities as tolerated     [x]  Continue plan of care  []  Update interventions per flow sheet       []  Discharge due to:_  []  Other:_      Ge Stern PT 11/5/2020  10:14 AM    Future Appointments   Date Time Provider Faye Wallisti   11/9/2020 10:45 AM Flor Heath PT [id number]   10/22/2021 11:30 AM Juan C Childs MD [facility]

## 2020-11-09 ENCOUNTER — HOSPITAL ENCOUNTER (OUTPATIENT)
Dept: PHYSICAL THERAPY | Age: 45
Discharge: HOME | End: 2020-11-09
Payer: COMMERCIAL

## 2020-11-09 PROCEDURE — 97110 THERAPEUTIC EXERCISES: CPT

## 2020-11-09 PROCEDURE — 97112 NEUROMUSCULAR REEDUCATION: CPT

## 2020-11-09 PROCEDURE — 97140 MANUAL THERAPY 1/> REGIONS: CPT

## 2020-11-09 NOTE — PROGRESS NOTES
[facility]  [address]  [place], [address]  [phone] fax    Physical Therapy Progress Note  Patient name: Adali Arguello Start of Care: 10/13/2020   Referral source: Reyna Polanco MD : 1975                Medical Diagnosis: Low back pain [M54.5]  Payor: Lila Mari / Plan: [employer] Mcqueen HMO / Product Type: HMO /  Onset Date:2019 with exacerbation May 2020                Treatment Diagnosis: LBP   Prior Hospitalization: see medical history Provider#: [id number]   Medications: Verified on Patient summary List    Comorbidities: depression, anxiety   Prior Level of Function: functionally I with all actvities                   Visits from Start of Care: 8    Missed Visits: 0        Key Functional Changes: The pt gained good relief following last treatment session with Graston therapy. She also is progressing with strength and stability exercises but is still limited. FOTO score is progressing towards predicted outcome. The pt will benefit from continued PT to further her current progress. Patient will continue to benefit from skilled PT services to modify and progress therapeutic interventions, address functional mobility deficits, address ROM deficits, address strength deficits, analyze and address soft tissue restrictions, analyze and cue movement patterns and assess and modify postural abnormalities to attain remaining goals. Progress towards goals   Short Term Goals: To be accomplished in 1 weeks:              1. The pt will be I and compliant with HEP              IE- issued and instructed in HEP              Current: met per patient report. 10/19/2020  Long Term Goals: To be accomplished in 4 weeks:              1. Improve FOTO score to 60 for improved ability for daily activities.             EB- 48   Current: progressing 54 on 20              2.  The pt will demonstrate 4+/5 left glute max MMT without pain increase to improve ability for daily tasks              JI- 4-/5               Current: 4-/5 11-9-20              3. The pt will report no difficulty with performing her usual work.  Buddhist Wen- Quite a bit of difficulty   Current: progressing moderate difficulty on 11-9-20              Current: Pt reports continued difficulty performing work related activities such as walking.              4. The pt will report no difficulty with going from sitting to standing.              IE- Quite a bit of difficulty. Current: no response in FOTO, moderate reported 11-9-20              5. The pt will demonstrate 4+/5 glute med MMT on the left to improve ability for gait.             GX- 4-/5               FQSPRLQ: progressing 4/5 11-9-20         Updated Goals: to be achieved in 4 weeks:                1. Improve FOTO score to 60 for improved ability for daily activities.             SH: 70               8. The pt will demonstrate 4+/5 left glute max MMT without pain increase to improve ability for daily tasks              PN- 4-/5               3. The pt will report no difficulty with performing her usual work.              PN-  moderate difficulty                      4. The pt will report no difficulty with going from sitting to standing.              PN-  moderate              5. The pt will demonstrate 4+/5 glute med MMT on the left to improve ability for gait.               PN  4/5       ASSESSMENT/RECOMMENDATIONS:  [x]Continue therapy per initial plan/protocol at a frequency of  2 x per week for 4 weeks  []Continue therapy with the following recommended changes:_____________________      _____________________________________________________________________  []Discontinue therapy progressing towards or have reached established goals  []Discontinue therapy due to lack of appreciable progress towards goals  []Discontinue therapy due to lack of attendance or compliance  []Await Physician's recommendations/decisions regarding therapy  []Other:________________________________________________________________    Thank you for this referral.    Martir Quyen, PT 11/9/2020 12:40 PM  NOTE TO PHYSICIAN:  PLEASE COMPLETE THE ORDERS BELOW AND   FAX TO [facility]: [phone]  If you are unable to process this request in 24 hours please contact our office: [phone]    ? I have read the above report and request that my patient continue as recommended. ? I have read the above report and request that my patient continue therapy with the following changes/special instructions:__________________________________________________________  ? I have read the above report and request that my patient be discharged from therapy.     Physicians signature: ______________________________Date: ______Time:______

## 2020-11-09 NOTE — PROGRESS NOTES
PT DAILY TREATMENT NOTE     Patient Name: Ghsasan Garcia  Date:2020  : 1975  [x]  Patient  Verified  Payor: Linus Vazquez / Plan: [employer] / Product Type: HMO /    In time:1048  Out time:1142  Total Treatment Time (min): 47  Visit #: 8 of 8      Treatment Area: Low back pain [M54.5]    SUBJECTIVE  Pain Level (0-10 scale): 1  Any medication changes, allergies to medications, adverse drug reactions, diagnosis change, or new procedure performed?: [x] No    [] Yes (see summary sheet for update)  Subjective functional status/changes:   [] No changes reported  The pt reports she was sore a couple of days but has felt better since. She reports pain is lowest it has been in a while.      OBJECTIVE    Modality rationale: decrease inflammation to improve the patients ability to perform ADL   Min Type Additional Details    [] Estim:  []Unatt       []IFC  []Premod                        []Other:  []w/ice   []w/heat  Position:  Location:    [] Estim: []Att    []TENS instruct  []NMES                    []Other:  []w/US   []w/ice   []w/heat  Position:  Location:    []  Traction: [] Cervical       []Lumbar                       [] Prone          []Supine                       []Intermittent   []Continuous Lbs:  [] before manual  [] after manual    []  Ultrasound: []Continuous   [] Pulsed                           []1MHz   []3MHz W/cm2:  Location:    []  Iontophoresis with dexamethasone         Location: [] Take home patch   [] In clinic   10 [x]  Ice     []  heat  []  Ice massage  []  Laser   []  Anodyne Position:prone  Location:lumbar    []  Laser with stim  []  Other:  Position:  Location:    []  Vasopneumatic Device Pressure:       [] lo [] med [] hi   Temperature: [] lo [] med [] hi   [] Skin assessment post-treatment:  []intact []redness- no adverse reaction    []redness  adverse reaction:       24 min Therapeutic Exercise:  [x] See flow sheet :   Rationale: increase ROM and increase strength to improve the patients ability to perform ADL       12 min Neuromuscular Re-education:  [x]  See flow sheet :   Rationale: increase strength, improve coordination and increase proprioception  to improve the patients ability to perform daily tasks    8 min Manual Therapy:  Graston with GT5 to left lower lumbar paraspinals and QL - pt prone   The manual therapy interventions were performed at a separate and distinct time from the therapeutic activities interventions. Rationale: decrease pain and increase tissue extensibility to improve ability for functional tasks            With   [] TE   [] TA   [] neuro   [] other: Patient Education: [x] Review HEP    [] Progressed/Changed HEP based on:   [] positioning   [] body mechanics   [] transfers   [] heat/ice application    [] other:      Other Objective/Functional Measures: FOTO: 54      Pain Level (0-10 scale) post treatment: 1    ASSESSMENT/Changes in Function: The pt gained good relief following last treatment session with Graston therapy. She also is progressing with strength and stability exercises but is still limited. The pt will benefit from continued PT to further her current progress. Patient will continue to benefit from skilled PT services to modify and progress therapeutic interventions, address functional mobility deficits, address ROM deficits, address strength deficits, analyze and address soft tissue restrictions, analyze and cue movement patterns and assess and modify postural abnormalities to attain remaining goals. []  See Plan of Care  [x]  See progress note/recertification  []  See Discharge Summary         Progress towards goals / Updated goals:  Short Term Goals: To be accomplished in 1 weeks:              1. The pt will be I and compliant with HEP              IE- issued and instructed in HEP              Current: met per patient report. 10/19/2020  Long Term Goals: To be accomplished in 4 weeks:              1.  Improve FOTO score to 60 for improved ability for daily activities.             EV- 48   Current: progressing 54 on 11-9-20              2. The pt will demonstrate 4+/5 left glute max MMT without pain increase to improve ability for daily tasks              IE- 4-/5               Current: 4-/5 11-9-20              3. The pt will report no difficulty with performing her usual work.  Payton Pane- Quite a bit of difficulty   Current: progressing moderate difficulty on 11-9-20              Current: Pt reports continued difficulty performing work related activities such as walking.              4. The pt will report no difficulty with going from sitting to standing.              IE- Quite a bit of difficulty. Current: no response in FOTO, moderate reported 11-9-20              5. The pt will demonstrate 4+/5 glute med MMT on the left to improve ability for gait.                TA- 4-/5               CVPOUZZ: progressing 4/5 11-9-20        PLAN  []  Upgrade activities as tolerated     [x]  Continue plan of care  []  Update interventions per flow sheet       []  Discharge due to:_  []  Other:_      Radha Del Castillo, PT 11/9/2020  10:52 AM    Future Appointments   Date Time Provider Faye Damon   10/22/2021 11:30 AM Janell Atkinson MD [facility]

## 2020-12-03 ENCOUNTER — APPOINTMENT (OUTPATIENT)
Dept: PHYSICAL THERAPY | Age: 45
End: 2020-12-03
Payer: COMMERCIAL

## 2020-12-07 ENCOUNTER — HOSPITAL ENCOUNTER (OUTPATIENT)
Dept: PHYSICAL THERAPY | Age: 45
Discharge: HOME | End: 2020-12-07
Payer: COMMERCIAL

## 2020-12-07 PROCEDURE — 97112 NEUROMUSCULAR REEDUCATION: CPT

## 2020-12-07 PROCEDURE — 97110 THERAPEUTIC EXERCISES: CPT

## 2020-12-07 PROCEDURE — 97140 MANUAL THERAPY 1/> REGIONS: CPT

## 2020-12-07 NOTE — PROGRESS NOTES
PT DAILY TREATMENT NOTE     Patient Name: Jani Ken  Date:2020  : 1975  [x]  Patient  Verified  Payor: Dylan Fine / Plan: [employer] / Product Type: HMO /    In time:9:00  Out time:9:45  Total Treatment Time (min): 45  Visit #: 1 of 8    Treatment Area: Low back pain [M54.5]    SUBJECTIVE  Pain Level (0-10 scale): 2-3/10  Any medication changes, allergies to medications, adverse drug reactions, diagnosis change, or new procedure performed?: [x] No    [] Yes (see summary sheet for update)  Subjective functional status/changes:   [x] No changes reported    OBJECTIVE    27 min Therapeutic Exercise:  [x] See flow sheet :   Rationale: increase ROM and increase strength to improve the patients ability to perform ADL's. 10 min Neuromuscular Re-education:  [x]  See flow sheet : Pilates trapeze series, QP series. Rationale: increase strength, improve coordination and increase proprioception  to improve the patients ability to perform functional activities. 8 min Manual Therapy:  Graston technique to Left QL, L/S paraspinals and glute med. Pt prone. The manual therapy interventions were performed at a separate and distinct time from the therapeutic activities interventions. Rationale: decrease pain, increase ROM, increase tissue extensibility and decrease trigger points to improve ease of performing functional activities. With   [x] TE   [] TA   [] neuro   [] other: Patient Education: [x] Review HEP    [] Progressed/Changed HEP based on:   [] positioning   [] body mechanics   [] transfers   [] heat/ice application    [] other:      Other Objective/Functional Measures: No significant changes since previous visit on 2020. Pt has been waiting for insurance authorization to continue PT. Pt reports usually no difficulty with sit to standing transition. Held ice post-treatment as pt reported a decrease in pain and will be active immediately after PT appointment.  Upon questioning, pt reports sitting for prolonged periods of time on the floor in \"Z\" sit with LE's to the left, instructed pt to hold off sitting in that position or at the least alternate sides. Pain Level (0-10 scale) post treatment: 1/10    ASSESSMENT/Changes in Function:      []  See Plan of Care  [x]  See progress note/recertification  []  See Discharge Summary         Progress towards goals / Updated goals:  Updated Goals: to be achieved in 4 weeks:                 1. Improve FOTO score to 60 for improved ability for daily activities.             IL: 84               6. The pt will demonstrate 4+/5 left glute max MMT without pain increase to improve ability for daily tasks              PN- 4-/5               3. The pt will report no difficulty with performing her usual work.              PN-  moderate difficulty               4. The pt will report no difficulty with going from sitting to standing.              PN-  moderate   Current:  Pt reports usually no difficulty with sit to standing transition. 12/7/2020              5. The pt will demonstrate 4+/5 glute med MMT on the left to improve ability for gait.               VD  4/5     PLAN  []  Upgrade activities as tolerated     [x]  Continue plan of care  []  Update interventions per flow sheet       []  Discharge due to:_  []  Other:_      Polly Alejandre, PTA 12/7/2020  9:02 AM    Future Appointments   Date Time Provider Faye Damon   10/22/2021 11:30 AM Althea Patrick MD [facility]

## 2020-12-07 NOTE — PROGRESS NOTES
[facility]  [address]  [place], [address]  [phone] fax    Physical Therapy Progress Note  Patient name: Chris Levi Start of Care: 10/13/2020   Referral source: Akilah Florez MD : 1975   Medical/Treatment Diagnosis: Low back pain [M54.5]  Payor: Saint Satchel / Plan: [employer] HMO / Product Type: HMO /  Onset Date:2019 with exacerbation May 2020     Prior Hospitalization: see medical history Provider#: [id number]   Medications: Verified on Patient Summary List    Comorbidities: depression, anxiety  Prior Level of Function:functionally I with all actvities  Visits from Start of Care: 9    Missed Visits: 0      Key Functional Changes:     No significant changes since previous visit on 2020. Pt has been waiting for insurance authorization to continue PT. Pt reports usually no difficulty with sit to standing transition. Held ice post-treatment as pt reported a decrease in pain and will be active immediately after PT appointment. Upon questioning, pt reports sitting for prolonged periods of time on the floor in \"Z\" sit with LE's to the left, instructed pt to hold off sitting in that position or at the least alternate sides.                1. Improve FOTO score to 60 for improved ability for daily activities.             PN: 54   Current: Not reassessed, first visit since previous PN.             2. The pt will demonstrate 4+/5 left glute max MMT without pain increase to improve ability for daily tasks              PN- 4-/5    Current: Not reassessed, first visit since previous PN.             3. The pt will report no difficulty with performing her usual work.  Tricia Youngial-  moderate difficulty    Current: Not reassessed, first visit since previous PN.             4.  The pt will report no difficulty with going from sitting to standing.              PN-  moderate              Current:  Pt reports usually no difficulty with sit to standing transition. 12/7/2020              5. The pt will demonstrate 4+/5 glute med MMT on the left to improve ability for gait.             PN  4/5    Current: Not reassessed, first visit since previous PN. Updated Goals: to be achieved in 4 weeks:   1. Improve FOTO score to 60 for improved ability for daily activities.             PN: 54               2. The pt will demonstrate 4+/5 left glute max MMT without pain increase to improve ability for daily tasks              PN- 4-/5               3. The pt will report no difficulty with performing her usual work.  Re Pointer difficulty   [id number]. The pt will demonstrate 4+/5 glute med MMT on the left to improve ability for gait.             PN  4/5     ASSESSMENT/RECOMMENDATIONS:  [x]Continue therapy per initial plan/protocol at a frequency of  1-2 x per week for 4 weeks, for 7 visits. []Continue therapy with the following recommended changes:_____________________      _____________________________________________________________________  []Discontinue therapy progressing towards or have reached established goals  []Discontinue therapy due to lack of appreciable progress towards goals  []Discontinue therapy due to lack of attendance or compliance  []Await Physician's recommendations/decisions regarding therapy  []Other:________________________________________________________________    Thank you for this referral.    Elyssa Calderon, PTA 12/7/2020 9:58 AM  NOTE TO PHYSICIAN:  PLEASE COMPLETE THE ORDERS BELOW AND   FAX TO [facility]: [phone]  If you are unable to process this request in 24 hours please contact our office: [phone]    ? I have read the above report and request that my patient continue as recommended.   ? I have read the above report and request that my patient continue therapy with the following changes/special instructions:__________________________________________________________  ? I have read the above report and request that my patient be discharged from therapy.     Physicians signature: ______________________________Date: ______Time:______

## 2020-12-14 ENCOUNTER — HOSPITAL ENCOUNTER (OUTPATIENT)
Dept: PHYSICAL THERAPY | Age: 45
Discharge: HOME | End: 2020-12-14
Payer: COMMERCIAL

## 2020-12-14 PROCEDURE — 97112 NEUROMUSCULAR REEDUCATION: CPT

## 2020-12-14 PROCEDURE — 97140 MANUAL THERAPY 1/> REGIONS: CPT

## 2020-12-14 PROCEDURE — 97110 THERAPEUTIC EXERCISES: CPT

## 2020-12-14 NOTE — PROGRESS NOTES
PT DAILY TREATMENT NOTE     Patient Name: Agustín Pollock  Date:2020  : 1975  [x]  Patient  Verified  Payor: Cash Richardson / Plan: [place] [employer] / Product Type: HMO /    In time:700  Out time: 746  Total Treatment Time (min): 46  Visit #: 1 of 7    Treatment Area: Low back pain [M54.5]    SUBJECTIVE  Pain Level (0-10 scale): 1-2  Any medication changes, allergies to medications, adverse drug reactions, diagnosis change, or new procedure performed?: [x] No    [] Yes (see summary sheet for update)  Subjective functional status/changes:   [] No changes reported  Patient reports slight discomfort in left glute. OBJECTIVE    24 min Therapeutic Exercise:  [x] See flow sheet :   Rationale: increase ROM, increase strength and improve coordination to improve the patients ability to perform ADLs and self care tasks with greater ease      14 min Neuromuscular Re-education:  [x]  See flow sheet: glute stabilization with bridges, trapeze bar series, quadruped exercises for core stabilization, core align series     Rationale: increase strength, improve coordination and increase proprioception  to improve the patients ability to perform functional tasks with improved stabilization     8 min Manual Therapy:  Prone - TPR to left glute and QL   The manual therapy interventions were performed at a separate and distinct time from the therapeutic activities interventions.   Rationale: decrease pain, increase tissue extensibility and decrease trigger points to perform functional tasks with greater ease           With   [] TE   [] TA   [] neuro   [] other: Patient Education: [x] Review HEP    [] Progressed/Changed HEP based on:   [] positioning   [] body mechanics   [] transfers   [] heat/ice application    [] other:        Pain Level (0-10 scale) post treatment: 1-2 \"sore\"     ASSESSMENT/Changes in Function: Patient demonstrating imporved tolerance to squats with core align machine today but continues to be challenged with left LE controlling movement. Patient with good form on quadruped exercises with minimal rotational compensations noted at trunk. Patient with hypertoncity through left glute and QL reduced following TPR to the area. Patient will continue to benefit from skilled PT services to modify and progress therapeutic interventions, address functional mobility deficits, address ROM deficits, address strength deficits, analyze and address soft tissue restrictions, analyze and cue movement patterns, analyze and modify body mechanics/ergonomics, assess and modify postural abnormalities, address imbalance/dizziness and instruct in home and community integration to attain remaining goals. [x]  See Plan of Care  []  See progress note/recertification  []  See Discharge Summary         Progress towards goals / Updated goals:       1. Improve FOTO score to 60 for improved ability for daily activities.             PN: 54               2. The pt will demonstrate 4+/5 left glute max MMT without pain increase to improve ability for daily tasks              PN- 4-/5               3. The pt will report no difficulty with performing her usual work.  Gwendlypietro Hunger-  moderate difficulty    Current:  Pt reports usually no difficulty with sit to standing transition. 12/7/2020              4. The pt will demonstrate 4+/5 glute med MMT on the left to improve ability for gait.                PN  4/5    Current: 12/14/2020 - 4+/5 with pain     PLAN  [x]  Upgrade activities as tolerated     []  Continue plan of care  []  Update interventions per flow sheet       []  Discharge due to:_  []  Other:_      Guanaco Levi, PT, DPT 12/14/2020  7:04 AM    Future Appointments   Date Time Provider Faye Damon   12/17/2020  6:00 PM [facility]   12/21/2020  6:00 PM [facility]   12/29/2020  6:00 PM [facility]   1/4/2021 10:00 AM Marva Vital, PT [facility]   1/7/2021  6:00 PM Andres Cobos, PT [facility]   1/11/2021  7:45 AM Maureen Ferrara, PT [facility]   10/22/2021 11:30 AM Fernanda Griffiths MD [facility]

## 2020-12-17 ENCOUNTER — HOSPITAL ENCOUNTER (OUTPATIENT)
Dept: PHYSICAL THERAPY | Age: 45
Discharge: HOME | End: 2020-12-17
Payer: COMMERCIAL

## 2020-12-17 PROCEDURE — 97112 NEUROMUSCULAR REEDUCATION: CPT

## 2020-12-17 PROCEDURE — 97110 THERAPEUTIC EXERCISES: CPT

## 2020-12-17 PROCEDURE — 97140 MANUAL THERAPY 1/> REGIONS: CPT

## 2020-12-17 NOTE — PROGRESS NOTES
PT DAILY TREATMENT NOTE     Patient Name: Ari Bray  Date:2020  : 1975  [x]  Patient  Verified  Payor: Shweta Alonzo / Plan: [employer] / Product Type: HMO /    In time: 5:50  Out time: 6:42  Total Treatment Time (min): 52  Visit #: 3 of 8    Treatment Area: Low back pain [M54.5]    SUBJECTIVE  Pain Level (0-10 scale): 3-4/10  Any medication changes, allergies to medications, adverse drug reactions, diagnosis change, or new procedure performed?: [x] No    [] Yes (see summary sheet for update)  Subjective functional status/changes:   [] No changes reported  The patient reports that her back has been a little more sore since having to sit in a hard chair at work earlier this week. OBJECTIVE  32 min Therapeutic Exercise:  [x] See flow sheet :   Rationale: increase ROM and increase strength to improve the patients ability to improve ADL ease. 12 min Neuromuscular Re-education:  [x]  See flow sheet :   Rationale: increase ROM and increase strength  to improve the patients ability to improve ADL ease. 8 min Manual Therapy:  Graston Technique Treatment Intervention:    Patient positioning: prone and prayer stretch    Treatment location: left QL and lumbar paraspinals    Graston Technique Instruments utilized: GT4, GT5    Explained effects and benefits of Graston Technique, including potential for post treatment soreness and bruising. Patient was provided the opportunity to ask any questions and verbalized understanding. Patient wished to proceed with treatment. Rationale: decrease pain, increase ROM and increase tissue extensibility to improve ADL ease.         With   [] TE   [] TA   [] neuro   [] other: Patient Education: [x] Review HEP    [] Progressed/Changed HEP based on:   [] positioning   [] body mechanics   [] transfers   [] heat/ice application    [] other:      Other Objective/Functional Measures:   glute max strength: 4+/5 MMT B    Pain Level (0-10 scale) post treatment: 2/10    ASSESSMENT/Changes in Function: Good progress regarding her glute strength notable today during session. Good pain control post session. Patient will continue to benefit from skilled PT services to modify and progress therapeutic interventions, address functional mobility deficits, address ROM deficits, address strength deficits, analyze and address soft tissue restrictions, analyze and cue movement patterns, analyze and modify body mechanics/ergonomics, assess and modify postural abnormalities and instruct in home and community integration to attain remaining goals. []  See Plan of Care  []  See progress note/recertification  []  See Discharge Summary         Progress towards goals / Updated goals:       1. Improve FOTO score to 60 for improved ability for daily activities.             PN: 54               2. The pt will demonstrate 4+/5 left glute max MMT without pain increase to improve ability for daily tasks              PN- 4-/5    Current: Met 4+/5 MMT B              3. The pt will report no difficulty with performing her usual work.  Toyin Parish-  moderate difficulty               Current:  Pt reports usually no difficulty with sit to standing transition. 12/7/2020              4. The pt will demonstrate 4+/5 glute med MMT on the left to improve ability for gait.               ND  9/7 MMT              Current: 12/14/2020 - 4+/5 with pain      PLAN  []  Upgrade activities as tolerated     [x]  Continue plan of care  []  Update interventions per flow sheet       []  Discharge due to:_  []  Other:_      Lenny Blair, PT 12/17/2020  6:10 PM    Future Appointments   Date Time Provider Faye Damon   12/21/2020  6:00 PM [facility]   12/29/2020  6:00 PM [facility] León[facility]   1/4/2021 10:00 AM [facility] Franz[facility]   1/7/2021  6:00 PM [facility]   1/11/2021  7:45 AM [facility]ital[facility] 10/22/2021 11:30 AM Mitesh Shankar MD [facility]

## 2020-12-21 ENCOUNTER — APPOINTMENT (OUTPATIENT)
Dept: PHYSICAL THERAPY | Age: 45
End: 2020-12-21
Payer: COMMERCIAL

## 2020-12-29 ENCOUNTER — HOSPITAL ENCOUNTER (OUTPATIENT)
Dept: PHYSICAL THERAPY | Age: 45
Discharge: HOME | End: 2020-12-29
Payer: COMMERCIAL

## 2020-12-29 PROCEDURE — 97140 MANUAL THERAPY 1/> REGIONS: CPT

## 2020-12-29 PROCEDURE — 97112 NEUROMUSCULAR REEDUCATION: CPT

## 2020-12-29 PROCEDURE — 97110 THERAPEUTIC EXERCISES: CPT

## 2020-12-29 NOTE — PROGRESS NOTES
PT DAILY TREATMENT NOTE     Patient Name: Tamara Diaz  Date:2020  : 1975  [x]  Patient  Verified  Payor: Bhargav Model / Plan: [employer] / Product Type: HMO /    In time:6:00  Out time:6:45  Total Treatment Time (min): 45  Visit #: 3 of 7    Treatment Area: Low back pain [M54.5]    SUBJECTIVE  Pain Level (0-10 scale): 8/10  Any medication changes, allergies to medications, adverse drug reactions, diagnosis change, or new procedure performed?: [x] No    [] Yes (see summary sheet for update)  Subjective functional status/changes:   [] No changes reported  \"Having more pain today. I was sitting in the car for 8 hours yesterday driving back into town. \"    OBJECTIVE    25 min Therapeutic Exercise:  [x] See flow sheet :   Rationale: increase ROM and increase strength to improve the patients ability to perform ADL's.     12 min Neuromuscular Re-education:  [x]  See flow sheet : Pilates trapeze series, QP series, CoreAlign series. Rationale: increase strength, improve coordination and increase proprioception  to improve the patients ability to perform functional activities. 8 min Manual Therapy:  Graston technique to (B) L/S paraspinals and QL, focused on Left > Right. Pt prone. The manual therapy interventions were performed at a separate and distinct time from the therapeutic activities interventions. Rationale: decrease pain, increase ROM, increase tissue extensibility and decrease trigger points to improve ease of performing functional activities. With   [x] TE   [] TA   [] neuro   [] other: Patient Education: [x] Review HEP    [] Progressed/Changed HEP based on:   [] positioning   [] body mechanics   [] transfers   [] heat/ice application    [] other:      Other Objective/Functional Measures: No change in there ex. Challenged with Left hip extension on Corealign.     Pain Level (0-10 scale) post treatment: 2/10    ASSESSMENT/Changes in Function: Pt reported a significant decrease in pain level post-treatment. Pt fully participated in treatment. Continue PT to increase core and (B) hip strength to improve ease of performing daily tasks. Patient will continue to benefit from skilled PT services to modify and progress therapeutic interventions, address functional mobility deficits, address ROM deficits, address strength deficits, analyze and address soft tissue restrictions, analyze and cue movement patterns and analyze and modify body mechanics/ergonomics to attain remaining goals. [x]  See Plan of Care  []  See progress note/recertification  []  See Discharge Summary         Progress towards goals / Updated goals:  1. Improve FOTO score to 60 for improved ability for daily activities.             PN: 54               2. The pt will demonstrate 4+/5 left glute max MMT without pain increase to improve ability for daily tasks              PN- 4-/5               Current: Met 4+/5 MMT B              3. The pt will report no difficulty with performing her usual work.  Real Maldonado difficulty               ASRFFPM:  Pt reports usually no difficulty with sit to standing transition. 12/7/2020              4. The pt will demonstrate 4+/5 glute med MMT on the left to improve ability for gait.               II  8/0 MMT              MZMIYZV: 12/14/2020 - 4+/5 with pain     PLAN  []  Upgrade activities as tolerated     [x]  Continue plan of care  []  Update interventions per flow sheet       []  Discharge due to:_  []  Other:_      Lamonte Velázquez, PTA 12/29/2020  6:08 PM    Future Appointments   Date Time Provider Faye Damon   1/4/2021 10:00 AM [facility]   1/7/2021  6:00 PM Donald Loza, PT [facility]   1/11/2021  7:45 AM [facility]   10/22/2021 11:30 AM Juanpablo Velasquez MD [facility]

## 2021-01-04 ENCOUNTER — HOSPITAL ENCOUNTER (OUTPATIENT)
Dept: PHYSICAL THERAPY | Age: 46
Discharge: HOME | End: 2021-01-04
Payer: COMMERCIAL

## 2021-01-04 PROCEDURE — 97112 NEUROMUSCULAR REEDUCATION: CPT

## 2021-01-04 PROCEDURE — 97110 THERAPEUTIC EXERCISES: CPT

## 2021-01-04 PROCEDURE — 97140 MANUAL THERAPY 1/> REGIONS: CPT

## 2021-01-04 NOTE — PROGRESS NOTES
PT DAILY TREATMENT NOTE 11    Patient Name: Mary Lake  Date:2021  : 1975  [x]  Patient  Verified  Payor: Arley Deleon / Plan: [employer] / Product Type: HMO /    In REVJ:1472  Out time:1047  Total Treatment Time (min): 52  Visit #: 4 of 7    Treatment Area: Low back pain [M54.5]    SUBJECTIVE  Pain Level (0-10 scale): 6  Any medication changes, allergies to medications, adverse drug reactions, diagnosis change, or new procedure performed?: [x] No    [] Yes (see summary sheet for update)  Subjective functional status/changes:   [] No changes reported  The pt reports her pain has been bad since her trip to [place] and after increased activity at work.     OBJECTIVE        Min Type Additional Details    [] Estim:  []Unatt       []IFC  []Premod                        []Other:  []w/ice   []w/heat  Position:  Location:    [] Estim: []Att    []TENS instruct  []NMES                    []Other:  []w/US   []w/ice   []w/heat  Position:  Location:    []  Traction: [] Cervical       []Lumbar                       [] Prone          []Supine                       []Intermittent   []Continuous Lbs:  [] before manual  [] after manual    []  Ultrasound: []Continuous   [] Pulsed                           []1MHz   []3MHz W/cm2:  Location:    []  Iontophoresis with dexamethasone         Location: [] Take home patch   [] In clinic    []  Ice     []  heat  []  Ice massage  []  Laser   []  Anodyne Position:  Location:    []  Laser with stim  []  Other:  Position:  Location:    []  Vasopneumatic Device Pressure:       [] lo [] med [] hi   Temperature: [] lo [] med [] hi   [] Skin assessment post-treatment:  []intact []redness- no adverse reaction    []redness - adverse reaction:       29 min Therapeutic Exercise:  [x] See flow sheet :   Rationale: increase ROM and increase strength to improve the patients ability to perform ADL       12 min Neuromuscular Re-education:  [x]  See flow sheet : QP stabs, Core Align Rationale: increase strength, improve coordination and increase proprioception  to improve the patients ability to perform functional tasks. 8 min Manual Therapy:  Graston to left lumbar paraspinals and QL with pt prone   The manual therapy interventions were performed at a separate and distinct time from the therapeutic activities interventions. Rationale: decrease pain, increase ROM, increase tissue extensibility and decrease trigger points to improve functional mobility             With   [] TE   [] TA   [] neuro   [] other: Patient Education: [x] Review HEP    [] Progressed/Changed HEP based on:   [] positioning   [] body mechanics   [] transfers   [] heat/ice application    [] other:      Other Objective/Functional Measures: + tenderness with increased tone along left lumbar paraspinals     Pain Level (0-10 scale) post treatment: 3    ASSESSMENT/Changes in Function: Pt with recent exacerbation of symptoms, which has slowed previous progress. Good relief post treatment today. She may require additional sessions due to recent exacerbation. Patient will continue to benefit from skilled PT services to modify and progress therapeutic interventions, address functional mobility deficits, address ROM deficits, address strength deficits, analyze and address soft tissue restrictions, analyze and cue movement patterns and analyze and modify body mechanics/ergonomics to attain remaining goals. []  See Plan of Care  []  See progress note/recertification  []  See Discharge Summary         Progress towards goals / Updated goals:  1. Improve FOTO score to 60 for improved ability for daily activities.             PN: 54               2. The pt will demonstrate 4+/5 left glute max MMT without pain increase to improve ability for daily tasks              PN- 4-/5               Current: Met 4+/5 MMT B              3.  The pt will report no difficulty with performing her usual work.  Josemanuel Valdivia difficulty               NHNWSLB:  Pt reports with recent increase pain 1-4-21              4. The pt will demonstrate 4+/5 glute med MMT on the left to improve ability for gait.               JL  3/4 MMT              RSQKKJA: 12/14/2020 - 4+/5 with pain        PLAN  []  Upgrade activities as tolerated     [x]  Continue plan of care  []  Update interventions per flow sheet       []  Discharge due to:_  []  Other:_      Mindy Sylvester, PT 1/4/2021  10:05 AM    Future Appointments   Date Time Provider Faye Damon   1/7/2021  6:00 PM [facility]   1/11/2021  5:15 PM [facility]   10/22/2021 11:30 AM Isha Pollock MD [facility]

## 2021-01-07 ENCOUNTER — HOSPITAL ENCOUNTER (OUTPATIENT)
Dept: PHYSICAL THERAPY | Age: 46
Discharge: HOME | End: 2021-01-07
Payer: COMMERCIAL

## 2021-01-07 PROCEDURE — 97140 MANUAL THERAPY 1/> REGIONS: CPT

## 2021-01-07 PROCEDURE — 97110 THERAPEUTIC EXERCISES: CPT

## 2021-01-07 PROCEDURE — 97112 NEUROMUSCULAR REEDUCATION: CPT

## 2021-01-07 NOTE — PROGRESS NOTES
PT DAILY TREATMENT NOTE     Patient Name: Roxane Guerin  Date:2021  : 1975  [x]  Patient  Verified  Payor: Db Shannon / Plan: [employer] / Product Type: HMO /    In time:6:00  Out time:6:45  Total Treatment Time (min): 45  Visit #: 5 of 7    Medicare/BCBS Only   Total Timed Codes (min):  45 1:1 Treatment Time:  45       Treatment Area: Low back pain [M54.5]    SUBJECTIVE  Pain Level (0-10 scale): 4/10  Any medication changes, allergies to medications, adverse drug reactions, diagnosis change, or new procedure performed?: [x] No    [] Yes (see summary sheet for update)  Subjective functional status/changes:   [] No changes reported  Pt reports compliance with HEP. OBJECTIVE    27 min Therapeutic Exercise:  [x] See flow sheet :   Rationale: increase ROM and increase strength to improve the patients ability to perform ADLs with increased ease. 10 min Neuromuscular Re-education:  [x]  See flow sheet :   Rationale: increase strength, improve coordination and increase proprioception  to improve the patients ability to perform ADLs with increased ease. 8 min Manual Therapy:  DTM to left QL and lumbar paraspinals with patient in prone. The manual therapy interventions were performed at a separate and distinct time from the therapeutic activities interventions. Rationale: decrease pain, increase ROM and increase tissue extensibility to improve tolerance to ADLs. With   [] TE   [] TA   [] neuro   [] other: Patient Education: [x] Review HEP    [] Progressed/Changed HEP based on:   [] positioning   [] body mechanics   [] transfers   [] heat/ice application    [] other:      Other Objective/Functional Measures: Continued trigger points through left lumbar paraspinals. Pain Level (0-10 scale) post treatment: 0/10    ASSESSMENT/Changes in Function: Pt continues to have left lumbar and hip pain with increased activity.      Patient will continue to benefit from skilled PT services to modify and progress therapeutic interventions, address functional mobility deficits, address ROM deficits, address strength deficits, analyze and address soft tissue restrictions, analyze and cue movement patterns and analyze and modify body mechanics/ergonomics to attain remaining goals. []  See Plan of Care  []  See progress note/recertification  []  See Discharge Summary         Progress towards goals / Updated goals:  1. Improve FOTO score to 60 for improved ability for daily activities.             PN: 54               2. The pt will demonstrate 4+/5 left glute max MMT without pain increase to improve ability for daily tasks              PN- 4-/5               Current: Met 4+/5 MMT B              3. The pt will report no difficulty with performing her usual work.  Barrett Rim-  moderate difficulty               RKRLLQR:  Pt reports with recent increase pain 1-4-21              4. The pt will demonstrate 4+/5 glute med MMT on the left to improve ability for gait.               IZ  2/4 MMT              RPMQOUB: 12/14/2020 - 4+/5 with pain     PLAN  []  Upgrade activities as tolerated     [x]  Continue plan of care  []  Update interventions per flow sheet       []  Discharge due to:_  []  Other:_      George Lozano, PTA 1/7/2021  6:05 PM    Future Appointments   Date Time Provider Faye Damon   1/11/2021  5:15 PM Nathalia Baker, PT [facility]   10/22/2021 11:30 AM Sharrie Koyanagi, MD [facility]

## 2021-01-11 ENCOUNTER — APPOINTMENT (OUTPATIENT)
Dept: PHYSICAL THERAPY | Age: 46
End: 2021-01-11
Payer: COMMERCIAL

## 2021-01-13 ENCOUNTER — HOSPITAL ENCOUNTER (OUTPATIENT)
Dept: PHYSICAL THERAPY | Age: 46
Discharge: HOME | End: 2021-01-13
Payer: COMMERCIAL

## 2021-01-13 PROCEDURE — 97112 NEUROMUSCULAR REEDUCATION: CPT

## 2021-01-13 PROCEDURE — 97110 THERAPEUTIC EXERCISES: CPT

## 2021-01-13 PROCEDURE — 97140 MANUAL THERAPY 1/> REGIONS: CPT

## 2021-01-13 NOTE — PROGRESS NOTES
PT DAILY TREATMENT NOTE     Patient Name: Leah Cordoba  Date:2021  : 1975  [x]  Patient  Verified  Payor: Candi Harrell / Plan: [employer] / Product Type: HMO /    In time:6:15  Out time:6:57  Total Treatment Time (min): 52  Visit #: 6 of 7    Treatment Area: Low back pain [M54.5]    SUBJECTIVE  Pain Level (0-10 scale): 2  Any medication changes, allergies to medications, adverse drug reactions, diagnosis change, or new procedure performed?: [x] No    [] Yes (see summary sheet for update)  Subjective functional status/changes:   [] No changes reported  The pt reports she is doing better but still having some pain. OBJECTIVE      34 min Therapeutic Exercise:  [x] See flow sheet :   Rationale: increase ROM and increase strength to improve the patients ability to perform ADL       10 min Neuromuscular Re-education:  [x]  See flow sheet :   Rationale: increase strength, improve coordination and increase proprioception  to improve the patients ability to perform daily tasks     8 min Manual Therapy:  Parvez with GT4 to left paraspinals and QL. Pt prone   The manual therapy interventions were performed at a separate and distinct time from the therapeutic activities interventions. Rationale: decrease pain, increase ROM and increase tissue extensibility to perform functional tasks. With   [] TE   [] TA   [] neuro   [] other: Patient Education: [x] Review HEP    [] Progressed/Changed HEP based on:   [] positioning   [] body mechanics   [] transfers   [] heat/ice application    [] other:      Other Objective/Functional Measures:       Pain Level (0-10 scale) post treatment: 1    ASSESSMENT/Changes in Function: The pt is progressing well with PT. Some myofacial tightness along the left lower paraspinals. Glute strength has improved. She will benefit from continuation for further progression.      Patient will continue to benefit from skilled PT services to modify and progress therapeutic interventions, address functional mobility deficits, address ROM deficits, address strength deficits, analyze and address soft tissue restrictions, analyze and cue movement patterns and analyze and modify body mechanics/ergonomics to attain remaining goals. []  See Plan of Care  [x]  See progress note/recertification  []  See Discharge Summary         Progress towards goals / Updated goals:  1. Improve FOTO score to 60 for improved ability for daily activities.             PN: 54               2. The pt will demonstrate 4+/5 left glute max MMT without pain increase to improve ability for daily tasks              PN- 4-/5               Current: Met 4+/5 MMT B              3. The pt will report no difficulty with performing her usual work.  Marilee Tyler-  moderate difficulty               AACZCSL:  Pt reports with recent increase pain 1-4-21              4. The pt will demonstrate 4+/5 glute med MMT on the left to improve ability for gait.               JF  2/4 MMT              GPQHFOP: MET 12/14/2020 - 4+/5 with pain        PLAN  []  Upgrade activities as tolerated     [x]  Continue plan of care  []  Update interventions per flow sheet       []  Discharge due to:_  []  Other:_      Addy Bernal, PT 1/13/2021  6:22 PM    Future Appointments   Date Time Provider Faye Damon   10/22/2021 11:30 AM Aiden Bernabe MD [facility]

## 2021-01-13 NOTE — PROGRESS NOTES
[facility]  [address]  [place], [address]  [phone] fax    Physical Therapy Progress Note  tient name: Tamara Diaz Start of Care: 10/13/2020   Referral source: Shakeel Jackson MD : 1975                Medical Diagnosis: Low back pain [M54.5]  Payor: Bhargav Model / Plan: [employer] H[employer] / Product Type: HMO /  Onset Date:2019 with exacerbation May 2020                Treatment Diagnosis: LBP   Prior Hospitalization: see medical history Provider#: [id number]   Medications: Verified on Patient summary List    Comorbidities: depression, anxiety   Prior Level of Function: functionally I with all actvities    Visits from Start of Care: 15    Missed Visits: 3                   Key Functional Changes: The pt is progressing well with PT. Some myofacial tightness along the left lower paraspinals. Glute strength has improved. She will benefit from continuation for further progression.      Progress towards goals  1. Improve FOTO score to 60 for improved ability for daily activities.             PN: 54               2. The pt will demonstrate 4+/5 left glute max MMT without pain increase to improve ability for daily tasks              PN- 4-/5               Current: Met 4+/5 MMT B              3. The pt will report no difficulty with performing her usual work.  Josephine Oconnell-  moderate difficulty               CVIUKHR:  Pt reports with recent increase pain 1-4-21              4. The pt will demonstrate 4+/5 glute med MMT on the left to improve ability for gait.             UJ  5/ MMT              YNUCNRZ: MET 2020 - 4+/5 with pain           Updated Goals: to be achieved in 2-3 weeks:   1. Improve FOTO score to 60 for improved ability for daily activities.             PN: 54                 2.  The pt will report no difficulty with performing her usual work.  Angelo Castillo difficulty            ASSESSMENT/RECOMMENDATIONS:  [x]Continue therapy per initial plan/protocol at a frequency of  2 x per week for 2-3 weeks  []Continue therapy with the following recommended changes:_____________________      _____________________________________________________________________  []Discontinue therapy progressing towards or have reached established goals  []Discontinue therapy due to lack of appreciable progress towards goals  []Discontinue therapy due to lack of attendance or compliance  []Await Physician's recommendations/decisions regarding therapy  []Other:________________________________________________________________    Thank you for this referral.    Eboni Martin, PT 1/13/2021 6:38 PM  NOTE TO PHYSICIAN:  PLEASE COMPLETE THE ORDERS BELOW AND   FAX TO [facility]: [phone]  If you are unable to process this request in 24 hours please contact our office: [phone]    ? I have read the above report and request that my patient continue as recommended. ? I have read the above report and request that my patient continue therapy with the following changes/special instructions:__________________________________________________________  ? I have read the above report and request that my patient be discharged from therapy.     Physicians signature: ______________________________Date: ______Time:______

## 2021-01-20 ENCOUNTER — HOSPITAL ENCOUNTER (OUTPATIENT)
Dept: PHYSICAL THERAPY | Age: 46
Discharge: HOME | End: 2021-01-20
Payer: COMMERCIAL

## 2021-01-20 PROCEDURE — 97140 MANUAL THERAPY 1/> REGIONS: CPT

## 2021-01-20 PROCEDURE — 97110 THERAPEUTIC EXERCISES: CPT

## 2021-01-20 PROCEDURE — 97112 NEUROMUSCULAR REEDUCATION: CPT

## 2021-01-20 NOTE — PROGRESS NOTES
PT DAILY TREATMENT NOTE     Patient Name: Kong George  Date:2021  : 1975  [x]  Patient  Verified  Payor: Yessenia Wayne / Plan: [employer] H[employer] / Product Type: HMO /    In time:6:10  Out time:6:54  Total Treatment Time (min): 44  Visit #: 1 of 3        Treatment Area: Low back pain [M54.5]    SUBJECTIVE  Pain Level (0-10 scale): 1-2  Any medication changes, allergies to medications, adverse drug reactions, diagnosis change, or new procedure performed?: [x] No    [] Yes (see summary sheet for update)  Subjective functional status/changes:   [] No changes reported  The pt reports she is feeling good today. She reports yesterday her pain was bad but is not sure why. The pt states there seems to be no reason that her pain is worse at times. OBJECTIVE      26 min Therapeutic Exercise:  [x] See flow sheet :   Rationale: increase ROM and increase strength to improve the patients ability to perform daily tasks     10 min Neuromuscular Re-education:  [x]  See flow sheet :   Rationale: increase strength, improve balance and increase proprioception  to improve the patients ability to perform functional tasks. 8 min Manual Therapy:  Graston with GT 5 to left paraspinals and QL with pt prone and in prayer stretch position   The manual therapy interventions were performed at a separate and distinct time from the therapeutic activities interventions. Rationale: decrease pain, increase ROM and increase tissue extensibility to perform daily tasks. With   [] TE   [] TA   [] neuro   [] other: Patient Education: [x] Review HEP    [] Progressed/Changed HEP based on:   [] positioning   [] body mechanics   [] transfers   [] heat/ice application    [] other:      Other Objective/Functional Measures:  Increase wt slightly with hip ext/DK     Pain Level (0-10 scale) post treatment: 0    ASSESSMENT/Changes in Function:  Good relief with Graston today.  Pt' pain does still vary from day to day but overall has improved. Patient will continue to benefit from skilled PT services to modify and progress therapeutic interventions, address functional mobility deficits, address ROM deficits, address strength deficits, analyze and address soft tissue restrictions and analyze and cue movement patterns to attain remaining goals. []  See Plan of Care  []  See progress note/recertification  []  See Discharge Summary         Progress towards goals / Updated goals:  Updated Goals: to be achieved in 2-3 weeks:              1. Improve FOTO score to 60 for improved ability for daily activities.             PN: 54                  2.  The pt will report no difficulty with performing her usual work.  Sherman Credit-  moderate difficulty    Current: remains difficult 01-20-21         PLAN  []  Upgrade activities as tolerated     [x]  Continue plan of care  []  Update interventions per flow sheet       []  Discharge due to:_  []  Other:_      Stephy Dowling, PT 1/20/2021  6:12 PM    Future Appointments   Date Time Provider Faye Damon   1/20/2021  6:15 PM Vicente Russ, PT [facility]   10/22/2021 11:30 AM Katelin Pickard MD [facility]

## 2021-01-26 ENCOUNTER — HOSPITAL ENCOUNTER (OUTPATIENT)
Dept: PHYSICAL THERAPY | Age: 46
Discharge: HOME | End: 2021-01-26
Payer: COMMERCIAL

## 2021-01-26 PROCEDURE — 97140 MANUAL THERAPY 1/> REGIONS: CPT

## 2021-01-26 PROCEDURE — 97110 THERAPEUTIC EXERCISES: CPT

## 2021-01-26 PROCEDURE — 97112 NEUROMUSCULAR REEDUCATION: CPT

## 2021-01-26 NOTE — PROGRESS NOTES
PT DAILY TREATMENT NOTE     Patient Name: James Edward  Date:2021   : 1975  [x]  Patient  Verified  Payor: Jina Lynne / Plan: [employer] / Product Type: HMO /    In time:5:15  Out time:6:00  Total Treatment Time (min): 45  Visit #: 2 of 3    Treatment Area: Low back pain [M54.5]    SUBJECTIVE  Pain Level (0-10 scale): 2-3/10  Any medication changes, allergies to medications, adverse drug reactions, diagnosis change, or new procedure performed?: [x] No    [] Yes (see summary sheet for update)  Subjective functional status/changes:   [] No changes reported  \"I woke up  morning in a lot of pain, I could barely get up. I've been taking 800mg of Ibuprofen morning and night since. \"    OBJECTIVE    25 min Therapeutic Exercise:  [x] See flow sheet :   Rationale: increase ROM and increase strength to improve the patients ability to perform ADL's.    12 min Neuromuscular Re-education:  [x]  See flow sheet : QP series, CoreAlign series, core stabs in supine. Rationale: increase strength, improve coordination and increase proprioception  to improve the patients ability to perform functional activities. 8 min Manual Therapy:  Graston technique to (B) L/S paraspinals and QL, focused on Left > Right, also Left lower T/S paraspinals. Pt prone. The manual therapy interventions were performed at a separate and distinct time from the therapeutic activities interventions. Rationale: decrease pain, increase ROM, increase tissue extensibility and decrease trigger points to improve ease of performing functional activities. With   [x] TE   [] TA   [] neuro   [] other: Patient Education: [x] Review HEP    [] Progressed/Changed HEP based on:   [] positioning   [] body mechanics   [] transfers   [] heat/ice application    [] other:      Other Objective/Functional Measures: Pt reports exacerbation of symptoms in the last 2-3 days. Reports no change in activity.      Pain Level (0-10 scale) post treatment: 1-2/10    ASSESSMENT/Changes in Function: Reported a decrease in tension/pain post-treatment. Demonstrates improved TA recruitment and able to maintain proper posture/mechanics with exercises. Mild restrictions in Left lower T/S paraspinals. Pt has made good progress towards goals since IE however reports exacerbated symptoms two days ago. Patient will continue to benefit from skilled PT services to modify and progress therapeutic interventions, address functional mobility deficits, address ROM deficits, address strength deficits, analyze and address soft tissue restrictions and analyze and modify body mechanics/ergonomics to attain remaining goals. [x]  See Plan of Care  []  See progress note/recertification  []  See Discharge Summary         Progress towards goals / Updated goals:  Updated Goals: to be achieved in 2-3 weeks:              1. Improve FOTO score to 60 for improved ability for daily activities.             PN: 54                  2.  The pt will report no difficulty with performing her usual work.  Salvatore Pedro-  moderate difficulty               Current: remains difficult 01-20-21    PLAN  []  Upgrade activities as tolerated     [x]  Continue plan of care  []  Update interventions per flow sheet       []  Discharge due to:_  []  Other:_      May Scott PTA 1/26/2021  5:06 PM    Future Appointments   Date Time Provider Faye Damon   1/26/2021  5:15 PM Remus Close, PTA [facility]   2/2/2021  6:00 PM Remus Close, PTA [facility]   10/22/2021 11:30 AM Leighann Crawford MD [facility]

## 2021-02-02 ENCOUNTER — HOSPITAL ENCOUNTER (OUTPATIENT)
Dept: PHYSICAL THERAPY | Age: 46
Discharge: HOME | End: 2021-02-02
Payer: COMMERCIAL

## 2021-02-02 PROCEDURE — 97110 THERAPEUTIC EXERCISES: CPT

## 2021-02-02 PROCEDURE — 97140 MANUAL THERAPY 1/> REGIONS: CPT

## 2021-02-02 PROCEDURE — 97112 NEUROMUSCULAR REEDUCATION: CPT

## 2021-02-02 NOTE — PROGRESS NOTES
PT DAILY TREATMENT NOTE     Patient Name: Axel Licea  Date:2021  : 1975  [x]  Patient  Verified  Payor: Ken Human / Plan: [employer] / Product Type: HMO /    In time:6:00  Out time:6:52  Total Treatment Time (min): 52  Visit #: 3 of 3    Treatment Area: Low back pain [M54.5]    SUBJECTIVE  Pain Level (0-10 scale): 0/10  Any medication changes, allergies to medications, adverse drug reactions, diagnosis change, or new procedure performed?: [x] No    [] Yes (see summary sheet for update)  Subjective functional status/changes:   [] No changes reported  \"No pain but I'm on mm relaxer's and Ibuprofen because I slipped on ice at work Saturday, landed on my back and bumped my head. It's a false reading of no pain because of the medication. \"    OBJECTIVE    32 min Therapeutic Exercise:  [x] See flow sheet :   Rationale: increase ROM and increase strength to improve the patients ability to perform ADL's.     12 min Neuromuscular Re-education:  [x]  See flow sheet : QP series, CoreAlign series, core stabs in supine. Rationale: increase strength, improve coordination and increase proprioception  to improve the patients ability to perform functional activities. 8 min Manual Therapy:  Graston technique to (B) L/S paraspinals and QL, focused on Left > Right, also Left lower T/S paraspinals. Pt prone. The manual therapy interventions were performed at a separate and distinct time from the therapeutic activities interventions. Rationale: decrease pain, increase ROM, increase tissue extensibility and decrease trigger points to improve ease of performing functional activities. With   [x] TE   [] TA   [] neuro   [] other: Patient Education: [x] Review HEP    [] Progressed/Changed HEP based on:   [] positioning   [] body mechanics   [] transfers   [] heat/ice application    [] other:      Other Objective/Functional Measures: FOTO 56%. Pt reports 50% overall subjective improvement.  Pt reports minimal difficulty performing usual work. Pt has made progress towards goals and reports periods of no pain. Continues to have difficulty maintaining TA recruitment with exercises. D/C to HEP at this time. Instructed pt to continue HEP and to further increase reps/resistance as tolerable. Pt given looped green t-band for home use. Pt had no further questions/comments/concerns. Pain Level (0-10 scale) post treatment: 0/10    ASSESSMENT/Changes in Function:      []  See Plan of Care  []  See progress note/recertification  [x]  See Discharge Summary         Progress towards goals / Updated goals:  Updated Goals: to be achieved in 2-3 weeks:              1. Improve FOTO score to 60 for improved ability for daily activities.             PN: 54    Current: Improved to 56%. 2/2/2021               2. The pt will report no difficulty with performing her usual work.  Kye Chang difficulty               KVKPQLJ: remains difficult 01-20-21; Pt reports minimal difficulty performing usual work. 2/2/2021    PLAN  []  Upgrade activities as tolerated     []  Continue plan of care  []  Update interventions per flow sheet       [x]  Discharge due to: D/C to HEP. Good progression towards goals.   []  Other:_      Irvin Dan, SHRUTHI 2/2/2021  6:00 PM    Future Appointments   Date Time Provider Faye Damon   10/22/2021 11:30 AM Chay Mendiola MD [facility]

## 2021-02-03 NOTE — PROGRESS NOTES
[facility]  [address]  [place], [address]  [phone] fax    Physical Therapy Discharge Summary  Patient name: Dash Rehman Start of Care: 10/13/2020   Referral source: Jacinta Ray MD : 1975   Medical/Treatment Diagnosis: Low back pain [M54.5]  Payor: Jarvis Newman / Plan: Jessica Mckeon / Product Type: HMO /  Onset Date:2019 with exacerbation May 2020                  Prior Hospitalization: see medical history Provider#: [id number]   Medications: Verified on Patient Summary List    Comorbidities: depression, anxiety  Prior Level of Function:functionally I with all actvities  Visits from Start of Care: 18    Missed Visits: 3  Reporting Period : 2021 to 2021      Summary of Care:      FOTO 56%. Pt reports 50% overall subjective improvement. Pt reports minimal difficulty performing usual work. Pt has made progress towards goals and reports periods of no pain. Fair carryover however has occasional bouts of increased pain. Continues to have difficulty maintaining TA recruitment with exercises. D/C to HEP at this time. Instructed pt to continue HEP and to further increase reps/resistance as tolerable. Pt given looped green t-band for home use. Pt had no further questions/comments/concerns. Progress towards goals:              1. Improve FOTO score to 60 for improved ability for daily activities.             PN: 54               Discharge: Improved to 56%. 2021               2. The pt will report no difficulty with performing her usual work.  Dax Boston:  moderate difficulty               Discharge: Met, Pt reports minimal difficulty performing usual work.  2021       ASSESSMENT/RECOMMENDATIONS:  [x]Discontinue therapy: [x]Patient has reached or is progressing toward set goals      []Patient is non-compliant or has abdicated      []Due to lack of appreciable progress towards set goals    Grey Aguilar PTA 2/3/2021 11:29 AM

## 2021-04-09 ENCOUNTER — VIRTUAL VISIT (OUTPATIENT)
Dept: FAMILY MEDICINE CLINIC | Age: 46
End: 2021-04-09
Payer: COMMERCIAL

## 2021-04-09 ENCOUNTER — TELEPHONE (OUTPATIENT)
Dept: FAMILY MEDICINE CLINIC | Age: 46
End: 2021-04-09

## 2021-04-09 DIAGNOSIS — J45.21 MILD INTERMITTENT ASTHMA WITH ACUTE EXACERBATION: ICD-10-CM

## 2021-04-09 DIAGNOSIS — U07.1 COVID-19: Primary | ICD-10-CM

## 2021-04-09 PROBLEM — J45.20 MILD INTERMITTENT ASTHMA: Status: ACTIVE | Noted: 2021-04-09

## 2021-04-09 PROCEDURE — 99214 OFFICE O/P EST MOD 30 MIN: CPT | Performed by: FAMILY MEDICINE

## 2021-04-09 RX ORDER — PREDNISONE 20 MG/1
TABLET ORAL
Qty: 8 TAB | Refills: 0 | Status: SHIPPED | OUTPATIENT
Start: 2021-04-09 | End: 2021-04-15

## 2021-04-09 RX ORDER — IBUPROFEN 800 MG/1
TABLET ORAL
COMMUNITY
Start: 2021-01-31

## 2021-04-09 NOTE — PROGRESS NOTES
Awilda Ching is a 41 y.o. female, established patient, who was seen by synchronous (real-time) audio-video technology on 4/9/2021 for Positive For Covid-19        Assessment & Plan:   1. COVID-19  Assessment & Plan:  Isolation period complete. Continue with plan for second vaccine dose 4/20. Letter for work. 2. Mild intermittent asthma with acute exacerbation  Assessment & Plan:  New diagnosis. Prednisone taper. TOMASA prn  Orders:  -     predniSONE (DELTASONE) 20 mg tablet; Take 40 mg by mouth daily (with breakfast) for 2 days, THEN 20 mg daily (with breakfast) for 2 days, THEN 20 mg daily (with breakfast) for 2 days. , Normal, Disp-8 Tab, R-0          712  Subjective:     positive COVID diagnosed on 4[facility]5/21at [facility], confirmed via [facility] 4/6. Was seen by an NP. CXR. Given prednisone 60 mg/day x 5 days and advised to talk to me about infusion. Symptom onset 3/31 \"run down\", low grade fever and chills 4/1  \"coughing fit\" unable to catch her breath 4/4 followed by   mild SOB = chief concern    first COVID vaccine 3/30/21 - some symptoms then attributed to allergies? Fall and spring allergies particularly [place] pears - needs albuterol when in [address] [facility]  No admissions, no steroids before this week  Episodic use TOMASA    No prior dx asthma    5 days prednisone  Albuterol 3-4x/day yesterday  None so far today    Triggered by talking. Prior to Admission medications    Medication Sig Start Date End Date Taking? Authorizing Provider   ibuprofen (MOTRIN) 800 mg tablet take 1 tablet by mouth three times a day if needed for pain 1/31/21  Yes Provider, Historical   PREDNISONE PO Take 60 mg by mouth. One tab po qd for 5 days   Yes Provider, Historical   acetaminophen (Tylenol Extra Strength) 500 mg tablet Take  by mouth every six (6) hours as needed for Pain. Yes Provider, Historical   citalopram (CELEXA) 10 mg tablet Take 1 Tab by mouth daily.  10/20/20  Yes Simone Allen MD   fexofenadine (ALLEGRA) 180 mg tablet Take 180 mg by mouth daily. Yes Provider, Historical   albuterol (PROVENTIL HFA, VENTOLIN HFA, PROAIR HFA) 90 mcg/actuation inhaler Take 2 Puffs by inhalation every four (4) hours as needed for Wheezing. 10/6/20  Yes Joellen Horne MD   inhalational spacing device 1 Each by Does Not Apply route as needed (chest tightness, shortness of breath). 10/6/20  Yes Joellen Horne MD   multivitamin (ONE A DAY) tablet Take 1 Tab by mouth daily. Yes Provider, Historical   melatonin 5 mg cap capsule Take 5 mg by mouth nightly. Yes Provider, Historical   meloxicam (MOBIC) 15 mg tablet Take 1 Tab by mouth daily. 9/24/20 4/9/21  Joellen Horne MD         Objective:     BP Readings from Last 3 Encounters:   10/23/20 118/84      No flowsheet data found. General: alert, cooperative, no distress   Mental  status: normal mood, behavior, speech, dress, motor activity, and thought processes, able to follow commands   HENT: NCAT   Neck: no visualized mass   Resp: no respiratory distress   Neuro: no gross deficits   Skin: no discoloration or lesions of concern on visible areas   Psychiatric: normal affect, consistent with stated mood, no evidence of hallucinations     Additional exam findings: We discussed the expected course, resolution and complications of the diagnosis(es) in detail. Medication risks, benefits, costs, interactions, and alternatives were discussed as indicated. I advised her to contact the office if her condition worsens, changes or fails to improve as anticipated. She expressed understanding with the diagnosis(es) and plan. Thien Scherer was evaluated through a patient-initiated, synchronous (real-time) audio-video encounter. The family is aware that it is a billable service. Verbal consent to proceed has been obtained within the past 12 months.   It was conducted pursuant to the emergency declaration under the [facility]5 waiver authority and the Coronavirus Preparedness and Response Supplemental Appropriations Act. Patient identification was verified, and a caregiver was present when appropriate. I was at home or in the office. The patient was at home.       Caren Ramirez MD

## 2021-04-09 NOTE — TELEPHONE ENCOUNTER
[facility] pharmacy called the office asking for clarification on directions for patients Prednisone that was sent in today, please review and advise.

## 2021-04-09 NOTE — PROGRESS NOTES
Patient being seen today for positive COVID she says [facility] she also has lab drawn on Tuesday for confirmation of the rapid. She says she is still having some SOB feels like she has a fever highest recorded we at 99. She has concerns due to still having breathing issues and today being her last dose of prednisone. She says she had also received her first COVID vaccine prior to onset of sx. 1. Have you been to the ER, urgent care clinic since your last visit? Hospitalized since your last visit? Yes When: 04/05/21 Where: [facility] Reason for visit: SOB, fever, + COVID  2. Have you seen or consulted any other health care providers outside of the [address] [place] [address] since your last visit? Include any pap smears or colon screening. No    Medication reconciliation has been completed with patient. Care team discussed/updated as well as pharmacy.     Health Maintenance Due   Topic Date Due    DTaP/Tdap/Td series (1 - Tdap) Never done

## 2021-04-09 NOTE — LETTER
NOTIFICATION RETURN TO WORK / SCHOOL 
 
4/9/2021 Ms. Bony Ahumada [address] [address] To Whom It May Concern: 
 
Bony Ahumada may return to work tomorrow. Consistent with CDC guidelines for those recovering from uncomplicated COVID. Standard precautions pertains If there are questions or concerns, please have the patient contact our office.  
 
 
 
Sincerely, 
 
 
Cliff Matos MD

## 2021-04-09 NOTE — TELEPHONE ENCOUNTER
Prednisone 20 mg tabs: 2 po daily x 2 days, 1 po daily x 2 days, 1/2 po daily x 2 days. If not able to split, then prednisone 10 mg tabs 1 po daily x 2 days.  NELI

## 2021-04-13 ENCOUNTER — TELEPHONE (OUTPATIENT)
Dept: FAMILY MEDICINE CLINIC | Age: 46
End: 2021-04-13

## 2021-04-13 NOTE — TELEPHONE ENCOUNTER
Options include: letter in support of work from home OR delayed return to work x 1 week. Generate whichever she prefers. Schedule follow up with me either Friday or next week to reassess.  NELI

## 2021-04-13 NOTE — TELEPHONE ENCOUNTER
Patient called the office stating she is not getting better she denies getting any worse. She says talking and walking up the stairs still causes SOB. She is using the inhaler and is still on her prednisone 20 mg. She says she is to return to work tomorrow and will need to wear a mask but does not feel she will be able to, please advise.

## 2021-04-14 ENCOUNTER — TELEPHONE (OUTPATIENT)
Dept: FAMILY MEDICINE CLINIC | Age: 46
End: 2021-04-14

## 2021-04-14 NOTE — LETTER
NOTIFICATION RETURN TO WORK / SCHOOL 
 
4/14/2021 1:58 PM 
 
Ms. Faith Alfonso [address] [address] To Whom It May Concern: 
 
Faith Alfonso is currently under the care of [facility]. Please allow patient to continue to work from home. She will return to work/school on:(in office)  04/19/21 If there are questions or concerns please have the patient contact our office.  
 
 
 
Sincerely, 
 
 
Nusrat Mackenzie MD

## 2021-04-14 NOTE — TELEPHONE ENCOUNTER
Called patient back asked if she would like the letter to support working from home or would she like this to delay her return to work for a week, patient states she would like the letter to support her working from home, she was told I will compose the letter to support her working from home until 4/16. When she has her VV with Dr. Freddy Dalal on 4/16 she will be able to extend the letter at that time if needed. Asked if she needs this faxed anywhere she says she will be able to print this from her my chart acct. Letter has been composed.

## 2021-04-14 NOTE — TELEPHONE ENCOUNTER
Patient called back today, stating she would prefer a letter in support of work from home OR delayed return to work x 1 week. Please review and advise and upload to patient's my chart. She can be reached at [phone] for any questions.

## 2021-04-14 NOTE — TELEPHONE ENCOUNTER
Called patient  verified she was made aware of options for her return to work, she says she is going to go into work today, she does not have to wear her mask if she is in her office so she thinks this will help her. She also reports that when she takes in a deep breath her lungs feel like they are burning.  She has been scheduled for VV f/u on 21 at 9:00 AM.

## 2021-04-16 ENCOUNTER — VIRTUAL VISIT (OUTPATIENT)
Dept: FAMILY MEDICINE CLINIC | Age: 46
End: 2021-04-16
Payer: COMMERCIAL

## 2021-04-16 DIAGNOSIS — J45.21 MILD INTERMITTENT ASTHMA WITH ACUTE EXACERBATION: Primary | ICD-10-CM

## 2021-04-16 DIAGNOSIS — R07.89 OTHER CHEST PAIN: ICD-10-CM

## 2021-04-16 PROCEDURE — 99214 OFFICE O/P EST MOD 30 MIN: CPT | Performed by: FAMILY MEDICINE

## 2021-04-16 RX ORDER — FLUTICASONE PROPIONATE 110 UG/1
1 AEROSOL, METERED RESPIRATORY (INHALATION) EVERY 12 HOURS
Qty: 1 INHALER | Refills: 1 | Status: SHIPPED | OUTPATIENT
Start: 2021-04-16 | End: 2021-04-30

## 2021-04-16 NOTE — PROGRESS NOTES
Patient being seen today for follow up on her asthma and SOB. She says she still gets SOB with walking up stairs and talking especially with her mask on. Patient says she took 10 mg of prednisone this AM and still has 10 mg for tomorrow and this will complete that treatment. 1. Have you been to the ER, urgent care clinic since your last visit? Hospitalized since your last visit? No  2. Have you seen or consulted any other health care providers outside of the [address] [place] [address] since your last visit? Include any pap smears or colon screening. No    Medication reconciliation has been completed with patient. Care team discussed/updated as well as pharmacy.     Health Maintenance Due   Topic Date Due    Pneumococcal 0-64 years (1 of 1 - PPSV23) Never done    DTaP/Tdap/Td series (1 - Tdap) Never done

## 2021-04-16 NOTE — ASSESSMENT & PLAN NOTE
Slowly improving. Unclear how much of her symptoms represent direct sequelae of COVID vs more severe exacerbation than typical, triggered by the pollen, but in the wake of that infection. Finish oral steroids. Add ICS. Double TOMASA. Telework x 2 weeks. To ER for acute onset severe chest pain or shortness of breath. Follow up 2 weeks. May reschedule for 2 months if doing well to revisit role of ICS.

## 2021-04-16 NOTE — PROGRESS NOTES
Smitha Buchanan is a 40 y.o. female, established patient, who was seen by synchronous (real-time) audio-video technology on 4/16/2021 for Asthma and Shortness of Breath        Assessment & Plan:   1. Mild intermittent asthma with acute exacerbation  Assessment & Plan:  Slowly improving. Unclear how much of her symptoms represent direct sequelae of COVID vs more severe exacerbation than typical, triggered by the pollen, but in the wake of that infection. Finish oral steroids. Add ICS. Double TOMASA. Telework x 2 weeks. To ER for acute onset severe chest pain or shortness of breath. Follow up 2 weeks. May reschedule for 2 months if doing well to revisit role of ICS. Orders:  -     fluticasone propionate (FLOVENT HFA) 110 mcg/actuation inhaler; Take 1 Puff by inhalation every twelve (12) hours. , Normal, Disp-1 Inhaler, R-1  2. Other chest pain  Assessment & Plan:  Most suggestive of pill esophagitis. Trial Pepto Bismol or Mylanta        On this date 04/16/2021 I have spent 30 minutes reviewing previous notes, test results and face to face (virtual) with the patient discussing the diagnosis and importance of compliance with the treatment plan as well as documenting on the day of the visit. 712  Subjective:      finishing steroid taper. Slowly improving though wtill shortness of breath with stairs and with mask wearing. Was able to sprint up the stairs 2x before joining the visit with me. 1.5 days of sensation \"like a pill went down the wrong way\" without attendant change in symptoms or new symptoms   Prior to Admission medications    Medication Sig Start Date End Date Taking? Authorizing Provider   ibuprofen (MOTRIN) 800 mg tablet take 1 tablet by mouth three times a day if needed for pain 1/31/21  Yes Provider, Historical   acetaminophen (Tylenol Extra Strength) 500 mg tablet Take  by mouth every six (6) hours as needed for Pain.    Yes Provider, Historical   citalopram (CELEXA) 10 mg tablet Take 1 Tab by mouth daily. 10/20/20  Yes Cee Raza MD   fexofenadine (ALLEGRA) 180 mg tablet Take 180 mg by mouth daily. Yes Provider, Historical   albuterol (PROVENTIL HFA, VENTOLIN HFA, PROAIR HFA) 90 mcg/actuation inhaler Take 2 Puffs by inhalation every four (4) hours as needed for Wheezing. 10/6/20  Yes Cee Raza MD   inhalational spacing device 1 Each by Does Not Apply route as needed (chest tightness, shortness of breath). 10/6/20  Yes Cee Raza MD   multivitamin (ONE A DAY) tablet Take 1 Tab by mouth daily. Yes Provider, Historical   melatonin 5 mg cap capsule Take 5 mg by mouth nightly. Yes Provider, Historical   predniSONE (DELTASONE) 20 mg tablet Take 40 mg by mouth daily (with breakfast) for 2 days, THEN 20 mg daily (with breakfast) for 2 days, THEN 20 mg daily (with breakfast) for 2 days. 4/9/21 4/15/21  Cee Raza MD   PREDNISONE PO Take 60 mg by mouth. One tab po qd for 5 days  4/16/21  Provider, Historical         Objective:     BP Readings from Last 3 Encounters:   10/23/20 118/84      No flowsheet data found. General: alert, cooperative, no distress   Mental  status: normal mood, behavior, speech, dress, motor activity, and thought processes, able to follow commands   HENT: NCAT   Neck: no visualized mass   Resp: Full sentences with pauses for deep breaths in between   Neuro: no gross deficits   Skin: no discoloration or lesions of concern on visible areas   Psychiatric: normal affect, consistent with stated mood, no evidence of hallucinations     Additional exam findings: We discussed the expected course, resolution and complications of the diagnosis(es) in detail. Medication risks, benefits, costs, interactions, and alternatives were discussed as indicated. I advised her to contact the office if her condition worsens, changes or fails to improve as anticipated. She expressed understanding with the diagnosis(es) and plan.        Heraclio Perry was evaluated through a patient-initiated, synchronous (real-time) audio-video encounter. The family is aware that it is a billable service. Verbal consent to proceed has been obtained within the past 12 months. It was conducted pursuant to the emergency declaration under the [address] [place] [address] and the [employer] and [employer] General Act. Patient identification was verified, and a caregiver was present when appropriate. I was at home or in the office. The patient was at home.       Katlin Brown MD

## 2021-04-16 NOTE — LETTER
NOTIFICATION RETURN TO WORK / SCHOOL 
 
4/16/2021 9:28 AM 
 
Ms. Ajay Pacheco [address] [address] To Whom It May Concern: 
 
Ajay Pacheco is currently under the care of [facility]. She recommend she Telework for the next two weeks. She may return to the office sooner if her condition improves such that she is able to comfortably wear a mask. If there are questions or concerns please have the patient contact our office.  
 
 
 
Sincerely, 
 
 
Nam Villa MD

## 2021-04-30 ENCOUNTER — VIRTUAL VISIT (OUTPATIENT)
Dept: FAMILY MEDICINE CLINIC | Age: 46
End: 2021-04-30
Payer: COMMERCIAL

## 2021-04-30 DIAGNOSIS — F41.9 ANXIETY: ICD-10-CM

## 2021-04-30 DIAGNOSIS — J45.21 MILD INTERMITTENT ASTHMA WITH ACUTE EXACERBATION: Primary | ICD-10-CM

## 2021-04-30 PROBLEM — U07.1 COVID-19: Status: RESOLVED | Noted: 2021-04-09 | Resolved: 2021-04-30

## 2021-04-30 PROCEDURE — 99214 OFFICE O/P EST MOD 30 MIN: CPT | Performed by: FAMILY MEDICINE

## 2021-04-30 RX ORDER — FLUTICASONE PROPIONATE 110 UG/1
2 AEROSOL, METERED RESPIRATORY (INHALATION) EVERY 12 HOURS
Qty: 3 INHALER | Refills: 4 | Status: SHIPPED | OUTPATIENT
Start: 2021-04-30 | End: 2021-11-29 | Stop reason: SDUPTHER

## 2021-04-30 NOTE — PROGRESS NOTES
Patient being seen today for f/u on her asthma, no other concerns. 1. Have you been to the ER, urgent care clinic since your last visit? Hospitalized since your last visit? No  2. Have you seen or consulted any other health care providers outside of the [address] [place] [address] since your last visit? Include any pap smears or colon screening. No    Medication reconciliation has been completed with patient. Care team discussed/updated as well as pharmacy.     Health Maintenance Due   Topic Date Due    Pneumococcal 0-64 years (1 of 1 - PPSV23) Never done    COVID-19 Vaccine (1) Never done    DTaP/Tdap/Td series (1 - Tdap) Never done

## 2021-04-30 NOTE — ASSESSMENT & PLAN NOTE
Had improved. Now worse with the heat. Will be able to shift to office with [facility]. Double ICS. Follow up 2 weeks. May defer to 4 weeks if doing well. Time will tell if she will return to prior mild intermittent status the farther removed from [facility] she is vs now persistent.

## 2021-04-30 NOTE — PROGRESS NOTES
Juan Carlos Julio is a 40 y.o. female, established patient, who was seen by synchronous (real-time) audio-video technology on 4/30/2021 for Asthma        Assessment & Plan:   1. Mild intermittent asthma with acute exacerbation  Assessment & Plan:  Had improved. Now worse with the heat. Will be able to shift to office with [facility]. Double ICS. Follow up 2 weeks. May defer to 4 weeks if doing well. Time will tell if she will return to prior mild intermittent status the farther removed from [facility] she is vs now persistent. Orders:  -     fluticasone propionate (FLOVENT HFA) 110 mcg/actuation inhaler; Take 2 Puffs by inhalation every twelve (12) hours. , Normal, Disp-3 Inhaler, R-4  2. Anxiety  Assessment & Plan:  Uncontrolled. Baseline condition vs impact of resp compromise and high use of TOMASA? Reassess after asthma controlled and TOMASA use moderated         Follow-up and Dispositions    · Return in about 2 weeks (around 5/14/2021) for asthma follow up, may push to 4 weeks if doing well; eval breast/vag itching in office if needed. 712  Subjective:     Breathing nearly normalized since last visit - walked 5 miles around 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 Authorizing Provider   fluticasone propionate (FLOVENT HFA) 110 mcg/actuation inhaler Take 1 Puff by inhalation every twelve (12) hours. 4/16/21  Yes Nay Landon MD   ibuprofen (MOTRIN) 800 mg tablet take 1 tablet by mouth three times a day if needed for pain 1/31/21  Yes Provider, Historical   citalopram (CELEXA) 10 mg tablet Take 1 Tab by mouth daily.  10/20/20  Yes Nay Landon MD   fexofenadine (ALLEGRA) 180 mg tablet Take 180 mg by mouth daily. Yes Provider, Historical   albuterol (PROVENTIL HFA, VENTOLIN HFA, PROAIR HFA) 90 mcg/actuation inhaler Take 2 Puffs by inhalation every four (4) hours as needed for Wheezing. 10/6/20  Yes Kirk Braxton MD   inhalational spacing device 1 Each by Does Not Apply route as needed (chest tightness, shortness of breath). 10/6/20  Yes Kirk Braxton MD   multivitamin (ONE A DAY) tablet Take 1 Tab by mouth daily. Yes Provider, Historical   melatonin 5 mg cap capsule Take 5 mg by mouth nightly. Yes Provider, Historical   acetaminophen (Tylenol Extra Strength) 500 mg tablet Take  by mouth every six (6) hours as needed for Pain. Provider, Historical         Objective:         General: alert, cooperative, no distress   Mental  status: normal mood, behavior, speech, dress, motor activity, and thought processes, able to follow commands   HENT: NCAT   Neck: no visualized mass   Resp: no respiratory distress   Neuro: no gross deficits   Skin: no discoloration or lesions of concern on visible areas   Psychiatric: normal affect, consistent with stated mood, no evidence of hallucinations     Additional exam findings: We discussed the expected course, resolution and complications of the diagnosis(es) in detail. Medication risks, benefits, costs, interactions, and alternatives were discussed as indicated. I advised her to contact the office if her condition worsens, changes or fails to improve as anticipated. She expressed understanding with the diagnosis(es) and plan. Danii Persaud was evaluated through a patient-initiated, synchronous (real-time) audio-video encounter. The family is aware that it is a billable service. Verbal consent to proceed has been obtained within the past 12 months.   It was conducted pursuant to the emergency declaration under the [address] [place], [address] waiver authority and the [employer] and [employer] App[employer]ations Act. Patient identification was verified, and a caregiver was present when appropriate. I was at home or in the office. The patient was at home.       Silvia Norton MD

## 2021-04-30 NOTE — ASSESSMENT & PLAN NOTE
Uncontrolled. Baseline condition vs impact of resp compromise and high use of TOMASA?  Reassess after asthma controlled and TOMASA use moderated

## 2021-05-03 ENCOUNTER — TELEPHONE (OUTPATIENT)
Dept: FAMILY MEDICINE CLINIC | Age: 46
End: 2021-05-03

## 2021-05-03 NOTE — TELEPHONE ENCOUNTER
Tried to submit PA request via cover my meds, received message back stating PA request could not be processed and to call the plan. Searched [employer] web site for PA form this medication was on preferred list as being covered, called pharmacy spoke with tech who verified patient does need PA and has [employer] as her prescription insurance plan.

## 2021-05-03 NOTE — TELEPHONE ENCOUNTER
Fax received from [address] [place] [address] meds stating prior auth is required for the Citalopram Hydrobromide 10MG  Submit a PA request  1. Go to key. [employer] and click \"Enter a Key\"  2. Patient last name: Dariel Mendiola    : 77      Key: [id number]  3.  Click \"start a PA\", complete the form, and \"send to plan\"

## 2021-05-13 ENCOUNTER — TELEPHONE (OUTPATIENT)
Dept: FAMILY MEDICINE CLINIC | Age: 46
End: 2021-05-13

## 2021-05-13 NOTE — TELEPHONE ENCOUNTER
Patient called requesting a call back in ref to her RX for fluticasone propionate (FLOVENT HFA) 110 mcg/actuation inhaler and Celexa. Please review and advise and call patient back as soon as possible.   [phone]

## 2021-05-13 NOTE — TELEPHONE ENCOUNTER
Called [facility] to f/u PA request was told [employer] handles PA request for patient and given number [phone] called [employer] and was told they only handle workman's comp for patient ,called [employer] was again directed to call [employer] spoke with rep who says she was able to see a claim being billed by patients pharmacy, this needs to be billed to her primary insurance. Cheo Arreola spoke with pharmacy who says this is the only insurance they have on file for patient told I will call patient to have her update her insurance, called patient no answer left message asking patient to call the office back.

## 2021-05-13 NOTE — TELEPHONE ENCOUNTER
Patient called the office back she says she was not requesting refills on her meds she went to pick these up and was told they both require PA. Told patient we did submit a PA request on her Celexa which I will call her ins to f/u status but had not received a request for PA on her Flovent inhaler. Told patient I will call her pharmacy and her insurance company to find out what is going on and will call her back. Called pharmacy was told the Flovent was not covered by patients insurance company and there was no alternative available and says the Celexa is being ran as generic and still coming up as non-preferred. Will call patients insurance company.

## 2021-05-14 ENCOUNTER — VIRTUAL VISIT (OUTPATIENT)
Dept: FAMILY MEDICINE CLINIC | Age: 46
End: 2021-05-14
Payer: COMMERCIAL

## 2021-05-14 DIAGNOSIS — R06.02 SHORTNESS OF BREATH: ICD-10-CM

## 2021-05-14 DIAGNOSIS — J45.21 MILD INTERMITTENT ASTHMA WITH ACUTE EXACERBATION: ICD-10-CM

## 2021-05-14 DIAGNOSIS — F33.9 EPISODE OF RECURRENT MAJOR DEPRESSIVE DISORDER, UNSPECIFIED DEPRESSION EPISODE SEVERITY (HCC): ICD-10-CM

## 2021-05-14 DIAGNOSIS — R07.89 CHEST TIGHTNESS: Primary | ICD-10-CM

## 2021-05-14 DIAGNOSIS — Z86.16 HISTORY OF 2019 NOVEL CORONAVIRUS DISEASE (COVID-19): ICD-10-CM

## 2021-05-14 PROCEDURE — 99214 OFFICE O/P EST MOD 30 MIN: CPT | Performed by: FAMILY MEDICINE

## 2021-05-14 NOTE — TELEPHONE ENCOUNTER
Called patient no answer left message on her voicemail letting her know I have been in contact with both her ins co and her pharmacy and the pharmacy is billing the wrong insurance for her medications they are sending this to [facility]ann[facility] who only handles workman's comp. Told is needs to update her prescription insurance with her pharmacy and this should solve the issue and asked that she call the office back with any questions.

## 2021-05-14 NOTE — PROGRESS NOTES
Bony Ahumada is a 40 y.o. female, established patient, who was seen by synchronous (real-time) audio-video technology on 5/14/2021 for Asthma        Assessment & Plan:   1. Chest tightness  Assessment & Plan:  Chest tightness/shortness of breath post COVID worse after initial eval with reassuring D Dimer, improving but more slowly than anticipated to tx for asthma, with far more severe symptoms than prior experience with asthma. CT to definitely eval for PE    Orders:  -     CTA CHEST W OR W WO CONT; Future  2. Shortness of breath  -     CTA CHEST W OR W WO CONT; Future  3. History of 2019 novel coronavirus disease (COVID-19)  -     CTA CHEST W OR W WO CONT; Future  4. Mild intermittent asthma with acute exacerbation  Assessment & Plan:  As above. Also discussed adding montelukast - but depression uncontrolled due to pharmacy/insurance triggered lapse in SSRI. Will be able to resume that Monday. She's understandably unwilling to risk even remote triggers for further decline. Reminded to ensure TOMASA on hand   5. Episode of recurrent major depressive disorder, unspecified depression episode severity ([address]  Assessment & Plan:  Uncontrolled due to lapse in rx. Low risk self harm. Follow-up and Dispositions    · Return in about 2 weeks (around 5/28/2021) for shortness of breath/CT follow up. 712  Subjective:     Still requiring albuterol once daily 3-4 puffs. \"something isn't right\"  shortness of breath multiple times/day, triggered by running up the stairs (which she is now able to do)  Chest tightness    Asthma Control Test 12Yrs Older 5/14/2021 4/30/2021   In the past 4 weeks, how much of the time did your asthma keep you from getting as much done at work, school, or at home?  3 3   During the past 4 weeks how often have you had shortness of breath 1 1   During the past 4 weeks often did your asthma symptoms wake up you at night or earlier than usual in the morning 5 3   During the past 4 weeks how often have you used your rescue inhaler or nebulizer medication  3 3   How would you rate your asthma control during the past 4 weeks 3 3   Score 15 13     Depression worse this week with lapse in SSRI due to pharmacy/insurance issues. Resolved as of yesterday     Prior to Admission medications    Medication Sig Start Date End Date Taking? Authorizing Provider   fluticasone propionate (FLOVENT HFA) 110 mcg/actuation inhaler Take 2 Puffs by inhalation every twelve (12) hours. 4/30/21  Yes Ella Chin MD   ibuprofen (MOTRIN) 800 mg tablet take 1 tablet by mouth three times a day if needed for pain 1/31/21  Yes Provider, Historical   fexofenadine (ALLEGRA) 180 mg tablet Take 180 mg by mouth daily. Yes Provider, Historical   albuterol (PROVENTIL HFA, VENTOLIN HFA, PROAIR HFA) 90 mcg/actuation inhaler Take 2 Puffs by inhalation every four (4) hours as needed for Wheezing. 10/6/20  Yes Ella Chin MD   inhalational spacing device 1 Each by Does Not Apply route as needed (chest tightness, shortness of breath). 10/6/20  Yes Ella Chin MD   multivitamin (ONE A DAY) tablet Take 1 Tab by mouth daily. Yes Provider, Historical   melatonin 5 mg cap capsule Take 5 mg by mouth nightly. Yes Provider, Historical   acetaminophen (Tylenol Extra Strength) 500 mg tablet Take  by mouth every six (6) hours as needed for Pain. Provider, Historical   citalopram (CELEXA) 10 mg tablet Take 1 Tab by mouth daily.  10/20/20   Ella Chin MD         Objective:     BP Readings from Last 3 Encounters:   10/23/20 118/84         General: alert, cooperative, no distress   Mental  status: normal mood, behavior, speech, dress, motor activity, and thought processes, able to follow commands   HENT: NCAT   Neck: no visualized mass   Resp: Mild increased work of breathing with speaking   Neuro: no gross deficits   Skin: no discoloration or lesions of concern on visible areas   Psychiatric: Tearful at times, consistent with stated mood, no evidence of hallucinations     Additional exam findings: We discussed the expected course, resolution and complications of the diagnosis(es) in detail. Medication risks, benefits, costs, interactions, and alternatives were discussed as indicated. I advised her to contact the office if her condition worsens, changes or fails to improve as anticipated. She expressed understanding with the diagnosis(es) and plan. Cordell Essex was evaluated through a patient-initiated, synchronous (real-time) audio-video encounter. The family is aware that it is a billable service. Verbal consent to proceed has been obtained within the past 12 months. It was conducted pursuant to the emergency declaration under the [facility], [address] [place] [address] authority and the [employer] and [employer] General Act. Patient identification was verified, and a caregiver was present when appropriate. I was at home or in the office. The patient was at home.       Caren Ramirez MD

## 2021-05-14 NOTE — PROGRESS NOTES
Patient being seen for 2 week f/u on her asthma she says she still has some SOB and gets \"winded\" with going up the stairs. She says she has gotten better but not 100 %. 1. Have you been to the ER, urgent care clinic since your last visit? Hospitalized since your last visit? No  2. Have you seen or consulted any other health care providers outside of the [address] [place] [address] since your last visit? Include any pap smears or colon screening. No    Medication reconciliation has been completed with patient. Care team discussed/updated as well as pharmacy.     Health Maintenance Due   Topic Date Due    Pneumococcal 0-64 years (1 of 1 - PPSV23) Never done    COVID-19 Vaccine (1) Never done    DTaP/Tdap/Td series (1 - Tdap) Never done

## 2021-05-14 NOTE — ASSESSMENT & PLAN NOTE
As above. Also discussed adding montelukast - but depression uncontrolled due to pharmacy/insurance triggered lapse in SSRI. Will be able to resume that Monday. She's understandably unwilling to risk even remote triggers for further decline.  Reminded to ensure TOMASA on hand

## 2021-05-14 NOTE — ASSESSMENT & PLAN NOTE
Chest tightness/shortness of breath post COVID worse after initial eval with reassuring D Dimer, improving but more slowly than anticipated to tx for asthma, with far more severe symptoms than prior experience with asthma.  CT to definitely eval for PE

## 2021-05-15 DIAGNOSIS — Z86.16 HISTORY OF 2019 NOVEL CORONAVIRUS DISEASE (COVID-19): ICD-10-CM

## 2021-05-15 DIAGNOSIS — R06.02 SHORTNESS OF BREATH: ICD-10-CM

## 2021-05-15 DIAGNOSIS — R07.89 CHEST TIGHTNESS: ICD-10-CM

## 2021-05-25 ENCOUNTER — HOSPITAL ENCOUNTER (OUTPATIENT)
Dept: CT IMAGING | Age: 46
Discharge: HOME | End: 2021-05-25
Attending: FAMILY MEDICINE
Payer: COMMERCIAL

## 2021-05-25 LAB — CREAT UR-MCNC: 0.8 MG/DL (ref 0.6–1.3)

## 2021-05-25 PROCEDURE — 74011000636 HC RX REV CODE- 636: Performed by: FAMILY MEDICINE

## 2021-05-25 PROCEDURE — 82565 ASSAY OF CREATININE: CPT

## 2021-05-25 PROCEDURE — 71275 CT ANGIOGRAPHY CHEST: CPT

## 2021-05-25 RX ADMIN — IOPAMIDOL 80 ML: 755 INJECTION, SOLUTION INTRAVENOUS at 08:58

## 2021-05-28 ENCOUNTER — OFFICE VISIT (OUTPATIENT)
Dept: FAMILY MEDICINE CLINIC | Age: 46
End: 2021-05-28
Payer: COMMERCIAL

## 2021-05-28 VITALS
DIASTOLIC BLOOD PRESSURE: 78 MMHG | HEART RATE: 87 BPM | OXYGEN SATURATION: 98 % | RESPIRATION RATE: 12 BRPM | WEIGHT: 202 LBS | SYSTOLIC BLOOD PRESSURE: 118 MMHG

## 2021-05-28 DIAGNOSIS — R06.02 SHORTNESS OF BREATH: Primary | ICD-10-CM

## 2021-05-28 DIAGNOSIS — Z86.16 HISTORY OF 2019 NOVEL CORONAVIRUS DISEASE (COVID-19): ICD-10-CM

## 2021-05-28 DIAGNOSIS — J45.21 MILD INTERMITTENT ASTHMA WITH ACUTE EXACERBATION: ICD-10-CM

## 2021-05-28 DIAGNOSIS — R01.1 HEART MURMUR: ICD-10-CM

## 2021-05-28 DIAGNOSIS — F33.9 EPISODE OF RECURRENT MAJOR DEPRESSIVE DISORDER, UNSPECIFIED DEPRESSION EPISODE SEVERITY (HCC): ICD-10-CM

## 2021-05-28 DIAGNOSIS — N28.89 RENAL MASS, LEFT: ICD-10-CM

## 2021-05-28 PROCEDURE — 99214 OFFICE O/P EST MOD 30 MIN: CPT | Performed by: FAMILY MEDICINE

## 2021-05-28 RX ORDER — MONTELUKAST SODIUM 10 MG/1
10 TABLET ORAL DAILY
Qty: 90 TABLET | Refills: 4 | Status: SHIPPED | OUTPATIENT
Start: 2021-05-28 | End: 2021-11-29 | Stop reason: SDUPTHER

## 2021-05-28 NOTE — PROGRESS NOTES
Sola Torres (: 1975) is a 40 y.o. female, established patient, here for    ASSESSMENT/PLAN:  1. Shortness of breath  Assessment & Plan:  Improving but not resolved. Post COVID 3/30/2021. CT confirms no PE. New murmur. Echo. Plan PFTs July. Aggressively manage asthma/allergies, adding montelukast   2. Mild intermittent asthma with acute exacerbation  Assessment & Plan:  COVID 3/30/2021 - continued shortness of breath, slow improvement since then  Orders:  -     PULMONARY FUNCTION TEST; Future  -     montelukast (SINGULAIR) 10 mg tablet; Take 1 Tablet by mouth daily. , Normal, Disp-90 Tablet, R-4  3. Renal mass, left  Assessment & Plan:  Incidental on CT for PE: \"small left renal hypodense module which needs renal ultrasound evaluation\"    Orders:  -     US KIDNEY LT; Future  4. History of 2019 novel coronavirus disease (COVID-19)  Assessment & Plan:     Orders:  -     PULMONARY FUNCTION TEST; Future  5. Heart murmur  Assessment & Plan:  New. Echo. Orders:  -     ECHO ADULT COMPLETE; Future  6. 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 Continue citalopram unchanged. Will watch for decline in control with addition of montelukast - stop if occurs, which is unlikely      Return in about 2 months (around 2021) for breathing and testing follow up. SUBJECTIVE/OBJECTIVE:  HPI  breathing slowly improving. Requiring albuterol 2-3x/week - though admits often elects to wait it out. Able to climb 3 flights stairs (winded at the top)      CT Results (most recent):  Results from Orders Only encounter on 05/15/21    CTA CHEST W OR W WO CONT    Narrative  CT chest with contrast for PE    HISTORY: Chest tightness. Shortness of breath. COVID-19 disease.     COMPARISON: None.    TECHNIQUE: Dynamic spiral scan through the chest is obtained from the thoracic  inlet to the diaphragm after dynamic nonionic IV contrast administration  per PE  protocol. Coronal and sagittal MIP computer reconstructions are also obtained  for better visualization of the integrity of pulmonary arteries in 3D dimension,  particularly for lobar/interlobar arterial branches and to minimize radiation  dose. All CT scans at this facility performed using dose optimization techniques as  appreciated to a performed exam, to include automated exposure control,  adjustment of the mA and or KU according to patient size (including appropriate  matching for site specific examination), or use of iterative reconstruction  technique. FINDINGS:    PULMONARY ARTERIES: The contrast bolus is adequate. The right and left mainstem  pulmonary arteries and their branches appear patent without convincing evidence  of intraluminal filling defect identified to suggest pulmonary embolism. AORTA AND VASCULAR STRUCTURES: No aortic aneurysm or dissection. Unremarkable  great vessels. Heart: The heart is normal in size. No pericardial effusion. LUNG PARENCHYMA: The lung parenchyma appears clear. No pulmonary infarction or  infiltration identified. IMAGED THYROID: Unremarkable. MEDIASTINUM: No adenopathy. A few small reactive lymph nodes seen in the  bilateral  axillae. PLEURAL SPACES AND CHEST WALL: No significant pleural pathology. VISUALIZED UPPER ABDOMEN: There is a 1.2 x 1.2 cm subtle hypodense nodule in the  upper pole of left kidney, not clearly seen. OSSEOUS STRUCTURES: Unremarkable. Impression  1. No convincing CT evidence of pulmonary embolism. 2.  No acute pulmonary or chest finding. 3. A small left renal hypodense nodule which needs renal ultrasound evaluation. Thank you for your referral.        Physical Exam  Constitutional:       General: She is not in acute distress. Appearance: She is well-developed. She is obese. HENT:      Head: Normocephalic and atraumatic. Cardiovascular:      Heart sounds: Murmur ( base and left sternal ) heard. Systolic murmur is present with a grade of 2/6. Pulmonary:      Breath sounds: No wheezing or rhonchi. Comments: Full sentences, but increased work of breathing  expiratory phase greater than inspiratory phase   Neurological:      Mental Status: She is alert and oriented to person, place, and time. Psychiatric:         Behavior: Behavior normal.         Thought Content: Thought content normal.         Judgment: Judgment normal.               An electronic signature was used to authenticate this note.   -- Kaci Baptiste MD

## 2021-05-28 NOTE — ASSESSMENT & PLAN NOTE
Incidental on CT for PE: \"small left renal hypodense module which needs renal ultrasound evaluation\"

## 2021-05-28 NOTE — ASSESSMENT & PLAN NOTE
Improving but not resolved. Post COVID 3/30/2021. CT confirms no PE. New murmur. Echo. Plan PFTs July.  Aggressively manage asthma/allergies, adding montelukast

## 2021-05-28 NOTE — PATIENT INSTRUCTIONS
Transthoracic Echocardiogram: About This Test 
What is it? An echocardiogram (also called an echo) uses sound waves to make an image of your heart. A device called a transducer sends sound waves that echo off your heart and back to the transducer. These echoes are turned into moving pictures of your heart that can be seen on a video screen. In a transthoracic echocardiogram (TTE), the transducer is moved across your chest or belly. A TTE is the most common type of echocardiogram. 
Why is this test done? This test is done to check your heart health. It's used for many reasons. For example, it may be done to: · Check a heart murmur. · Look for the cause of shortness of breath or unexplained chest pain or pressure. · Check how well your heart is pumping blood. · Check to see how well your heart valves are working. · Look for blood clots inside your heart. · Measure the size and shape of the heart's chambers. · Measure the blood pressure and speed of blood flow through the heart. How is the test done? · You may be asked to remove your clothes above your waist. You may be given a cloth or paper covering to use during the test. 
· You will lie on your back or on your left side on a bed or table. · You may receive medicine through a vein (intravenously, or IV). The IV can be used to give you a contrast material. This helps your doctor get good views of your heart. · Small pads or patches (electrodes) will be placed on the skin of your chest to record your heart rate during the test. 
· A small amount of gel will be rubbed on the side of your chest to help  the sound waves. · The transducer will be pressed firmly against your chest and moved slowly back and forth. It is usually moved to different areas on your chest or belly to get specific views of your heart.  
· You will be asked to do several things, such as hold very still, breathe in and out very slowly, hold your breath, or lie on your left side. · When the test is over, the gel is wiped off and the electrodes are removed. What are the risks of the test? 
There are no known risks from having this test. 
· No electricity passes through your body during the test. There is no danger of getting an electrical shock. · You do not receive any radiation. What happens after the test? 
· You will probably be able to go home right away. It depends on the reason for the test. 
· You can go back to your usual activities right away. Follow-up care is a key part of your treatment and safety. Be sure to make and go to all appointments, and call your doctor if you are having problems. It's also a good idea to keep a list of the medicines you take. Ask your doctor when you can expect to have your test results. Where can you learn more? Go to http://www.gray.com/ Enter E130 in the search box to learn more about \"Transthoracic Echocardiogram: About This Test.\" Current as of: August 31, 2020               Content Version: 12.8 © 2006-2021 [employer]. Care instructions adapted under license by [employer] (which disclaims liability or warranty for this information). If you have questions about a medical condition or this instruction, always ask your healthcare professional. [address] any warranty or liability for your use of this information. Lung Function Tests: About These Tests What are these tests? Lung function tests measure how much air your lungs hold and how quickly your lungs can move the air in and out. Spirometry is often the first lung function test that is done. You may also have other tests, such as gas diffusion tests, body plethysmography, inhalation challenge tests, and exercise stress tests. Your doctor will explain which tests you need. These tests check how well your lungs work. They may also be called pulmonary function tests, or PFTs.  
Why are these tests done? Doctors use lung function tests to find the cause of breathing problems and diagnose lung diseases like asthma or emphysema. You may have lung function tests before you have surgery. Or your doctor may use lung function tests to find out how well treatment for a lung problem is working. How do you prepare for these tests? · Let your doctor know if you take medicines for a lung problem. You may need to stop some of them before the tests. · Do not eat a heavy meal just before this test. A full stomach may keep your lungs from fully expanding. · Don't smoke or do intense exercise for 6 hours before the test. 
· For the test, wear loose clothing that doesn't restrict your breathing in any way. · Avoid food or drinks with caffeine. Caffeine can cause your airways to relax and allow more air than usual to pass through. · If you have dentures, wear them during the test. They help you form a tight seal around the mouthpiece of the machine. How are these tests done? What happens during the test depends on the type of test you have. For most tests, you will wear a nose clip. This is to make sure that no air passes in or out of your nose during the test. You then breathe into a mouthpiece attached to a recording device. · For some tests, you breathe in and out as deeply and as fast as you can. · You may repeat some tests after you inhale a medicine that expands your airways. · You may breathe certain gases, such as 100% oxygen or a mixture of helium and air. · For body plethysmography, you sit inside a small beebe with windows. The beebe measures pressure changes that occur as you breathe. The therapist may urge you to breathe deeply during some of the tests to get the best results. You may have a blood test to check the oxygen and carbon dioxide levels in your blood. How long do these tests take? The testing may take from 5 to 30 minutes, depending on how many tests you have.  
What happens after these tests? · You will probably be able to go home right after the tests. · You can go back to your normal activities right away. Follow-up care is a key part of your treatment and safety. Be sure to make and go to all appointments, and call your doctor if you are having problems. It's also a good idea to know your test results and keep a list of the medicines you take. Where can you learn more? Go to http://www.gray.com/ Enter D601 in the search box to learn more about \"Lung Function Tests: About These Tests. \" Current as of: October 26, 2020               Content Version: 12.8 © 2742-8279 [employer], [employer]. Care instructions adapted under license by [employer] (which disclaims liability or warranty for this information). If you have questions about a medical condition or this instruction, always ask your healthcare professional. [address] any warranty or liability for your use of this information.

## 2021-05-28 NOTE — PROGRESS NOTES
Pt in office for CT results. 1. Have you been to the ER, urgent care clinic since your last visit? Hospitalized since your last visit? No    2. Have you seen or consulted any other health care providers outside of the [address] [place] [address] since your last visit? Include any pap smears or colon screening.  No

## 2021-05-28 NOTE — ASSESSMENT & PLAN NOTE
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 Continue citalopram unchanged.  Will watch for decline in control with addition of montelukast - stop if occurs, which is unlikely

## 2021-06-04 DIAGNOSIS — R01.1 HEART MURMUR: ICD-10-CM

## 2021-06-04 DIAGNOSIS — N28.89 RENAL MASS, LEFT: ICD-10-CM

## 2021-06-18 ENCOUNTER — APPOINTMENT (OUTPATIENT)
Dept: NON INVASIVE DIAGNOSTICS | Age: 46
End: 2021-06-18
Attending: FAMILY MEDICINE

## 2021-06-18 ENCOUNTER — APPOINTMENT (OUTPATIENT)
Dept: ULTRASOUND IMAGING | Age: 46
End: 2021-06-18
Attending: FAMILY MEDICINE

## 2021-06-18 ENCOUNTER — TELEPHONE (OUTPATIENT)
Dept: FAMILY MEDICINE CLINIC | Age: 46
End: 2021-06-18

## 2021-06-22 ENCOUNTER — OFFICE VISIT (OUTPATIENT)
Dept: FAMILY MEDICINE CLINIC | Age: 46
End: 2021-06-22
Payer: COMMERCIAL

## 2021-06-22 VITALS
DIASTOLIC BLOOD PRESSURE: 81 MMHG | HEART RATE: 78 BPM | OXYGEN SATURATION: 98 % | TEMPERATURE: 97.7 F | SYSTOLIC BLOOD PRESSURE: 124 MMHG | WEIGHT: 192 LBS | RESPIRATION RATE: 12 BRPM

## 2021-06-22 DIAGNOSIS — J98.01 BRONCHOSPASM: ICD-10-CM

## 2021-06-22 DIAGNOSIS — Z87.440 HISTORY OF URINARY TRACT INFECTION: ICD-10-CM

## 2021-06-22 DIAGNOSIS — R06.02 SHORTNESS OF BREATH: ICD-10-CM

## 2021-06-22 DIAGNOSIS — Z86.16 HISTORY OF 2019 NOVEL CORONAVIRUS DISEASE (COVID-19): ICD-10-CM

## 2021-06-22 DIAGNOSIS — B37.31 VULVOVAGINAL CANDIDIASIS: Primary | ICD-10-CM

## 2021-06-22 PROCEDURE — 99213 OFFICE O/P EST LOW 20 MIN: CPT | Performed by: FAMILY MEDICINE

## 2021-06-22 RX ORDER — ALBUTEROL SULFATE 90 UG/1
2 AEROSOL, METERED RESPIRATORY (INHALATION)
Qty: 1 INHALER | Refills: 0 | Status: SHIPPED | OUTPATIENT
Start: 2021-06-22

## 2021-06-22 RX ORDER — NITROFURANTOIN 25; 75 MG/1; MG/1
CAPSULE ORAL
COMMUNITY
Start: 2021-06-19 | End: 2021-08-10 | Stop reason: ALTCHOICE

## 2021-06-22 RX ORDER — FLUCONAZOLE 150 MG/1
TABLET ORAL
COMMUNITY
Start: 2021-06-19 | End: 2021-08-10 | Stop reason: ALTCHOICE

## 2021-06-22 RX ORDER — PHENAZOPYRIDINE HYDROCHLORIDE 200 MG/1
TABLET, FILM COATED ORAL
COMMUNITY
Start: 2021-06-13 | End: 2021-08-10 | Stop reason: ALTCHOICE

## 2021-06-22 NOTE — PROGRESS NOTES
Joshua Diop (: 1975) is a 40 y.o. female, established patient, here for:    ASSESSMENT/PLAN:  1. Vulvovaginal candidiasis  Comments:  resolved with fluconazole. Symptoms may potentially recur given extension of antibiotic course x few days past fluconazole dose. Has a second fluconazole prn  2. History of urinary tract infection  Comments:  2 UTIs within 3 months, not typical, during season when her system has been compromised. Does not necessarily implicate larger pathology. Reassurance. 3. Bronchospasm  -     albuterol (PROVENTIL HFA, VENTOLIN HFA, PROAIR HFA) 90 mcg/actuation inhaler; Take 2 Puffs by inhalation every four (4) hours as needed for Wheezing., Normal, Disp-1 Inhaler, R-0  4. History of 2019 novel coronavirus disease (COVID-19)  5. Shortness of breath  Assessment & Plan:  Post COVID. Slowly improving. PFTs, echo scheduled July with plan for office visit to follow. Return if symptoms worsen or fail to improve. SUBJECTIVE/OBJECTIVE:  HPI    Her for vaginal itching. 3 days post fluconazole 150 mg. \"This is the best I've felt in a week and a half. \"    UTI x 2 in recent months:  3/27/2021 - Klebsiella 5K-100K cfu  2021 - Enterococcus faecalis 10K-25K cfu   Also with yeast 1K without further ID    Tx TMP-sulfa then switched to nitrofurantoin. Finishing that course today    Prednisone 2021 (asthma/COVID)    Worried there's an overarching condition leading to development of UTIs and vaginitis, back pain and renal cyst.    L Kidney US scheduled later this week    Post COVID shortness of breath/asthma: sprinted to avoid thunder showers yesterday. Did not require albuterol. Physical Exam  Constitutional:       General: She is not in acute distress. Appearance: She is well-developed. HENT:      Head: Normocephalic and atraumatic.    Pulmonary:      Effort: Pulmonary effort is normal.   Neurological:      Mental Status: She is alert and oriented to person, place, and time.   Psychiatric:         Behavior: Behavior normal.         Thought Content:  Thought content normal.         Judgment: Judgment normal.               -- Hailee Polo MD

## 2021-06-22 NOTE — PROGRESS NOTES
Pt was seen at Urgent Care for UTI on 3/27 and 6/14. Was given Bactrim at first, but then changed to [address] because bactrim did not treat bacteria that was found in urine. She states she is not having UTI sxs at this time. 1. Have you been to the ER, urgent care clinic since your last visit? Hospitalized since your last visit?was seen at [facility] on [address] 3/27 and 6/14.     2. Have you seen or consulted any other health care providers outside of the [address] [place] [address] since your last visit? Include any pap smears or colon screening.  No

## 2021-06-25 ENCOUNTER — HOSPITAL ENCOUNTER (OUTPATIENT)
Dept: NON INVASIVE DIAGNOSTICS | Age: 46
Discharge: HOME | End: 2021-06-25
Attending: FAMILY MEDICINE
Payer: COMMERCIAL

## 2021-06-25 ENCOUNTER — HOSPITAL ENCOUNTER (OUTPATIENT)
Dept: ULTRASOUND IMAGING | Age: 46
Discharge: HOME | End: 2021-06-25
Attending: FAMILY MEDICINE
Payer: COMMERCIAL

## 2021-06-25 VITALS
WEIGHT: 192 LBS | DIASTOLIC BLOOD PRESSURE: 81 MMHG | BODY MASS INDEX: 35.33 KG/M2 | HEIGHT: 62 IN | SYSTOLIC BLOOD PRESSURE: 124 MMHG

## 2021-06-25 DIAGNOSIS — N28.89 RENAL MASS, LEFT: ICD-10-CM

## 2021-06-25 LAB
ECHO AO ASC DIAM: 3 CM
ECHO AO ROOT DIAM: 2.58 CM
ECHO AV AREA PEAK VELOCITY: 2.53 CM2
ECHO AV AREA VTI: 2.57 CM2
ECHO AV AREA/BSA PEAK VELOCITY: 1.3 CM2/M2
ECHO AV AREA/BSA VTI: 1.4 CM2/M2
ECHO AV MEAN GRADIENT: 5.46 MMHG
ECHO AV PEAK GRADIENT: 10.11 MMHG
ECHO AV PEAK VELOCITY: 158.98 CM/S
ECHO AV VTI: 35.74 CM
ECHO LA AREA 4C: 15.27 CM2
ECHO LA VOL 2C: 52.67 ML (ref 22–52)
ECHO LA VOL 4C: 33.06 ML (ref 22–52)
ECHO LA VOL BP: 44.74 ML (ref 22–52)
ECHO LA VOL/BSA BIPLANE: 23.8 ML/M2 (ref 16–28)
ECHO LA VOLUME INDEX A2C: 28.02 ML/M2 (ref 16–28)
ECHO LA VOLUME INDEX A4C: 17.59 ML/M2 (ref 16–28)
ECHO LV INTERNAL DIMENSION DIASTOLIC: 3.9 CM (ref 3.9–5.3)
ECHO LV INTERNAL DIMENSION SYSTOLIC: 2.96 CM
ECHO LV IVSD: 0.81 CM (ref 0.6–0.9)
ECHO LV MASS 2D: 102.1 G (ref 67–162)
ECHO LV MASS INDEX 2D: 54.3 G/M2 (ref 43–95)
ECHO LV POSTERIOR WALL DIASTOLIC: 0.95 CM (ref 0.6–0.9)
ECHO LVOT DIAM: 1.97 CM
ECHO LVOT PEAK GRADIENT: 6.95 MMHG
ECHO LVOT PEAK VELOCITY: 131.77 CM/S
ECHO LVOT SV: 91.9 ML
ECHO LVOT VTI: 30.17 CM
ECHO TV REGURGITANT MAX VELOCITY: 182.09 CM/S
ECHO TV REGURGITANT PEAK GRADIENT: 13.26 MMHG
LVOT MG: 3.88 MMHG

## 2021-06-25 PROCEDURE — 93306 TTE W/DOPPLER COMPLETE: CPT

## 2021-06-25 PROCEDURE — 76770 US EXAM ABDO BACK WALL COMP: CPT

## 2021-06-28 DIAGNOSIS — J45.21 MILD INTERMITTENT ASTHMA WITH ACUTE EXACERBATION: ICD-10-CM

## 2021-06-28 DIAGNOSIS — Z86.16 HISTORY OF 2019 NOVEL CORONAVIRUS DISEASE (COVID-19): ICD-10-CM

## 2021-07-29 ENCOUNTER — HOSPITAL ENCOUNTER (OUTPATIENT)
Dept: RESPIRATORY THERAPY | Age: 46
Discharge: HOME | End: 2021-07-29
Attending: FAMILY MEDICINE
Payer: COMMERCIAL

## 2021-07-29 PROCEDURE — 94726 PLETHYSMOGRAPHY LUNG VOLUMES: CPT

## 2021-07-29 PROCEDURE — 94729 DIFFUSING CAPACITY: CPT

## 2021-07-29 PROCEDURE — 94010 BREATHING CAPACITY TEST: CPT

## 2021-08-10 ENCOUNTER — OFFICE VISIT (OUTPATIENT)
Dept: FAMILY MEDICINE CLINIC | Age: 46
End: 2021-08-10
Payer: COMMERCIAL

## 2021-08-10 VITALS
DIASTOLIC BLOOD PRESSURE: 78 MMHG | HEIGHT: 62 IN | TEMPERATURE: 98 F | WEIGHT: 198 LBS | SYSTOLIC BLOOD PRESSURE: 128 MMHG | HEART RATE: 97 BPM | OXYGEN SATURATION: 100 % | BODY MASS INDEX: 36.44 KG/M2 | RESPIRATION RATE: 12 BRPM

## 2021-08-10 DIAGNOSIS — J30.89 NON-SEASONAL ALLERGIC RHINITIS, UNSPECIFIED TRIGGER: ICD-10-CM

## 2021-08-10 DIAGNOSIS — J45.30 MILD PERSISTENT ASTHMA, UNSPECIFIED WHETHER COMPLICATED: Primary | ICD-10-CM

## 2021-08-10 PROCEDURE — 99214 OFFICE O/P EST MOD 30 MIN: CPT | Performed by: FAMILY MEDICINE

## 2021-08-10 NOTE — PROGRESS NOTES
Mark Liu (: 1975) is a 40 y.o. female, established patient, here for:    ASSESSMENT/PLAN:  1. Mild persistent asthma, unspecified whether complicated  Assessment & Plan:  normal PFTs. Complete resolution of symptoms when travelled to [place] implicates allergy mediate asthma. Overall doing well, but lacks the exercise capacity she enjoyed there, indicating not completely well controlled here. Historically worse in the fall. We discussed sending for evaluation of immunotherapy. Her insurance is changing imminently. Will continue fluticasone 110, montelukast, albuterol, fexofenadine. Add salmeterol. Follow up with me 2 months and revisit IT option. Orders:  -     salmeteroL (SEREVENT) 50 mcg/dose dsdv; Take 1 Puff by inhalation two (2) times a day., Normal, Disp-180 Blister, R-1  2. Non-seasonal allergic rhinitis, unspecified trigger      Return in about 2 months (around 10/10/2021) for asthma follow up, well woman same day, no pap (46) - check schedule. SUBJECTIVE/OBJECTIVE:  HPI  Follow up bronchospasm/SOB - asthma, allergies and post COVID. Overall much better. Not using albuterol, but still lacks full exercise capacity. Immediately felt much better, back to baseline, when travelled to [place]. Decline immediately upon return. Starts new job later this month (new insurance)   PFT Results  (Last 3 results in the past 10 years)               21 1846  PULMONARY FUNCTION TEST Final result    Impression:  Pulmonary Function Test   Test meets ATS criteria       FLOWS:   Normal flows       Volumes:   Normal Vital Capacity       Flow Volume Loop:   Normal Flow Volume Loop       Bronchodilator:   NA       Diffusion:   Normal Diffusion Capacity       Impression:   Normal study       Comment:   See technicians comments. Jaimee Lopez MD                       Physical Exam  Constitutional:       General: She is not in acute distress. Appearance: She is well-developed.    HENT: Head: Normocephalic and atraumatic. Pulmonary:      Effort: Pulmonary effort is normal.   Neurological:      Mental Status: She is alert and oriented to person, place, and time. Psychiatric:         Behavior: Behavior normal.         Thought Content:  Thought content normal.         Judgment: Judgment normal.               -- Kaelyn Cohen MD

## 2021-08-10 NOTE — PATIENT INSTRUCTIONS
The [employer] (USPSTF) now recommends colon cancer screening starting at age 39. Their standards of evidence for all recommendations are extremely high, requiring high quality studies showing benefit. Please check with your insurance carrier to determine at what age they will pay for colon cancer screening and follow up with me if you wish to proceed.

## 2021-08-10 NOTE — PROGRESS NOTES
Chief Complaint   Patient presents with    Asthma     Pt in office for f/u PFT        1. Have you been to the ER, urgent care clinic since your last visit? Hospitalized since your last visit? No    2. Have you seen or consulted any other health care providers outside of the [address] [place] [address] since your last visit? Include any pap smears or colon screening.  No

## 2021-08-10 NOTE — ASSESSMENT & PLAN NOTE
normal PFTs. Complete resolution of symptoms when travelled to [place] implicates allergy mediate asthma. Overall doing well, but lacks the exercise capacity she enjoyed there, indicating not completely well controlled here. Historically worse in the fall. We discussed sending for evaluation of immunotherapy. Her insurance is changing imminently. Will continue fluticasone 110, montelukast, albuterol, fexofenadine. Add salmeterol. Follow up with me 2 months and revisit IT option.

## 2021-08-16 ENCOUNTER — CLINICAL SUPPORT (OUTPATIENT)
Dept: FAMILY MEDICINE CLINIC | Age: 46
End: 2021-08-16
Payer: COMMERCIAL

## 2021-08-16 DIAGNOSIS — Z11.1 PPD SCREENING TEST: Primary | ICD-10-CM

## 2021-08-16 LAB
MM INDURATION POC: NORMAL (ref 0–5)
PPD POC: NORMAL

## 2021-08-16 PROCEDURE — 86580 TB INTRADERMAL TEST: CPT | Performed by: FAMILY MEDICINE

## 2021-08-16 NOTE — PROGRESS NOTES
PPD Placement note  Flaco Barrett, 40 y.o. female is here today for placement of PPD test  Reason for PPD test: work  Pt taken PPD test before: yes  Verified in allergy area and with patient that they are not allergic to the products PPD is made of (Phenol or Tween). Yes  Is patient taking any oral or IV steroid medication now or have they taken it in the last month? no  Has the patient ever received the BCG vaccine?: no  Has the patient been in recent contact with anyone known or suspected of having active TB disease?: no       Date of exposure (if applicable): na       Name of person they were exposed to (if applicable): na  Patient's Country of origin[place]  O: Alert and oriented in NAD. P:  PPD placed on 8/16/2021. Patient advised to return for reading within 48-72 hours.

## 2021-08-18 ENCOUNTER — CLINICAL SUPPORT (OUTPATIENT)
Dept: FAMILY MEDICINE CLINIC | Age: 46
End: 2021-08-18

## 2021-08-18 DIAGNOSIS — Z11.1 ENCOUNTER FOR PPD SKIN TEST READING: Primary | ICD-10-CM

## 2021-08-19 NOTE — PROGRESS NOTES
PPD Reading Note  [facility] and [facility] Result: 0 mm induration.   Interpretation: neg  If test not read within 48-72 hours of initial placement, patient advised to repeat in other arm 1-3 weeks after this test.  Allergic reaction: no

## 2021-11-29 ENCOUNTER — OFFICE VISIT (OUTPATIENT)
Dept: FAMILY MEDICINE CLINIC | Age: 46
End: 2021-11-29
Payer: COMMERCIAL

## 2021-11-29 VITALS
TEMPERATURE: 98 F | OXYGEN SATURATION: 97 % | DIASTOLIC BLOOD PRESSURE: 84 MMHG | SYSTOLIC BLOOD PRESSURE: 122 MMHG | BODY MASS INDEX: 35.11 KG/M2 | RESPIRATION RATE: 10 BRPM | HEIGHT: 62 IN | WEIGHT: 190.8 LBS | HEART RATE: 84 BPM

## 2021-11-29 DIAGNOSIS — Z12.31 ENCOUNTER FOR SCREENING MAMMOGRAM FOR MALIGNANT NEOPLASM OF BREAST: ICD-10-CM

## 2021-11-29 DIAGNOSIS — E66.9 OBESITY (BMI 30.0-34.9): ICD-10-CM

## 2021-11-29 DIAGNOSIS — J30.89 NON-SEASONAL ALLERGIC RHINITIS, UNSPECIFIED TRIGGER: ICD-10-CM

## 2021-11-29 DIAGNOSIS — Z01.419 WELL WOMAN EXAM: ICD-10-CM

## 2021-11-29 DIAGNOSIS — J45.30 MILD PERSISTENT ASTHMA, UNSPECIFIED WHETHER COMPLICATED: Primary | ICD-10-CM

## 2021-11-29 PROBLEM — Z86.16 HISTORY OF 2019 NOVEL CORONAVIRUS DISEASE (COVID-19): Status: RESOLVED | Noted: 2021-05-14 | Resolved: 2021-11-29

## 2021-11-29 PROCEDURE — 99396 PREV VISIT EST AGE 40-64: CPT | Performed by: FAMILY MEDICINE

## 2021-11-29 PROCEDURE — 99214 OFFICE O/P EST MOD 30 MIN: CPT | Performed by: FAMILY MEDICINE

## 2021-11-29 RX ORDER — MONTELUKAST SODIUM 10 MG/1
10 TABLET ORAL DAILY
Qty: 90 TABLET | Refills: 4 | Status: SHIPPED | OUTPATIENT
Start: 2021-11-29

## 2021-11-29 RX ORDER — FLUTICASONE PROPIONATE 110 UG/1
2 AEROSOL, METERED RESPIRATORY (INHALATION) EVERY 12 HOURS
Qty: 3 EACH | Refills: 4 | Status: SHIPPED | OUTPATIENT
Start: 2021-11-29 | End: 2022-08-30 | Stop reason: DRUGHIGH

## 2021-11-29 NOTE — PATIENT INSTRUCTIONS
A Healthy Lifestyle: Care Instructions  Your Care Instructions     A healthy lifestyle can help you feel good, stay at a healthy weight, and have plenty of energy for both work and play. A healthy lifestyle is something you can share with your whole family. A healthy lifestyle also can lower your risk for serious health problems, such as high blood pressure, heart disease, and diabetes. You can follow a few steps listed below to improve your health and the health of your family. Follow-up care is a key part of your treatment and safety. Be sure to make and go to all appointments, and call your doctor if you are having problems. It's also a good idea to know your test results and keep a list of the medicines you take. How can you care for yourself at home? · Do not eat too much sugar, fat, or fast foods. You can still have dessert and treats now and then. The goal is moderation. · Start small to improve your eating habits. Pay attention to portion sizes, drink less juice and soda pop, and eat more fruits and vegetables. ? Eat a healthy amount of food. A 3-ounce serving of meat, for example, is about the size of a deck of cards. Fill the rest of your plate with vegetables and whole grains. ? Limit the amount of soda and sports drinks you have every day. Drink more water when you are thirsty. ? Eat plenty of fruits and vegetables every day. Have an apple or some carrot sticks as an afternoon snack instead of a candy bar. Try to have fruits and/or vegetables at every meal.  · Make exercise part of your daily routine. You may want to start with simple activities, such as walking, bicycling, or slow swimming. Try to be active 30 to 60 minutes every day. You do not need to do all 30 to 60 minutes all at once. For example, you can exercise 3 times a day for 10 or 20 minutes.  Moderate exercise is safe for most people, but it is always a good idea to talk to your doctor before starting an exercise program.  · Keep moving. [facility] the lawn, work in the garden, or [employer]. Take the stairs instead of the elevator at work. · If you smoke, quit. People who smoke have an increased risk for heart attack, stroke, cancer, and other lung illnesses. Quitting is hard, but there are ways to boost your chance of quitting tobacco for good. ? Use nicotine gum, patches, or lozenges. ? Ask your doctor about stop-smoking programs and medicines. ? Keep trying. In addition to reducing your risk of diseases in the future, you will notice some benefits soon after you stop using tobacco. If you have shortness of breath or asthma symptoms, they will likely get better within a few weeks after you quit. · Limit how much alcohol you drink. Moderate amounts of alcohol (up to 2 drinks a day for men, 1 drink a day for women) are okay. But drinking too much can lead to liver problems, high blood pressure, and other health problems. Family health  If you have a family, there are many things you can do together to improve your health. · Eat meals together as a family as often as possible. · Eat healthy foods. This includes fruits, vegetables, lean meats and dairy, and whole grains. · Include your family in your fitness plan. Most people think of activities such as jogging or tennis as the way to fitness, but there are many ways you and your family can be more active. Anything that makes you breathe hard and gets your heart pumping is exercise. Here are some tips:  ? Walk to do errands or to take your child to school or the bus.  ? Go for a family bike ride after dinner instead of watching TV. Where can you learn more? Go to http://www.gray.com/  Enter Q616 in the search box to learn more about \"A Healthy Lifestyle: Care Instructions. \"  Current as of: June 16, 2021               Content Version: 13.0  © 3981-7562 Healthwise, Incorporated.    Care instructions adapted under license by [employer] (which disclaims liability or warranty for this information). If you have questions about a medical condition or this instruction, always ask your healthcare professional. [address] any warranty or liability for your use of this information.

## 2021-11-29 NOTE — ASSESSMENT & PLAN NOTE
Well controlled, continue present management with fluticasone 110 (resuming), montelukast, albuterol, fexofenadine (resuming) and salmeterol. We discussed sending for evaluation of immunotherapy, but addition of salmeterol so effective, declining at the moment, which is reasonable.

## 2021-11-29 NOTE — PROGRESS NOTES
Kamilah Rodriguez is a 40 y.o. female here for Well Woman Exam    Assessment/Plan:   1. Mild persistent asthma, unspecified whether complicated  Assessment & Plan:  Well controlled, continue present management with fluticasone 110 (resuming), montelukast, albuterol, fexofenadine (resuming) and salmeterol. We discussed sending for evaluation of immunotherapy, but addition of salmeterol so effective, declining at the moment, which is reasonable. Orders:  -     fluticasone propionate (FLOVENT HFA) 110 mcg/actuation inhaler; Take 2 Puffs by inhalation every twelve (12) hours. , Print, Disp-3 Each, R-4  -     montelukast (SINGULAIR) 10 mg tablet; Take 1 Tablet by mouth daily. , Print, Disp-90 Tablet, R-4  -     salmeteroL (SEREVENT) 50 mcg/dose dsdv; Take 1 Puff by inhalation two (2) times a day., Print, Disp-180 Blister, R-1  2. Non-seasonal allergic rhinitis, unspecified trigger  Assessment & Plan:  Uncontrolled. Resume fexofenadine. Plan for year round therapy, rather than seasonal.   Orders:  -     montelukast (SINGULAIR) 10 mg tablet; Take 1 Tablet by mouth daily. , Print, Disp-90 Tablet, R-4  3. Well woman exam  4. Encounter for screening mammogram for malignant neoplasm of breast  -     [facility] MAMMO BI SCREENING INCL CAD; Future  5. Obesity (BMI 30.0-34. 9)  Assessment & Plan:  Therapeutic lifestyle interventions      Advised to discuss updated colon cancer screening recommendations with her insurance carrier to determine coverage. Discussed risks and benefits of breast cancer screening for women in their 45s. Electing to screen. Follow-up and Dispositions    · Return in about 6 months (around 5/29/2022) for asthma follow up, (15). Subjective:     Preventive care visit. Been vaccinated against: flu, COVID and Tdap (7 years ago during pregnancy)      Patient's last menstrual period was 11/25/2021 (exact date).   Health Maintenance   Topic Date Due    COVID-19 Vaccine (1) Never done    DTaP/Tdap/Td series (1 - Tdap) Never done    Colorectal Cancer Screening Combo  Never done    Flu Vaccine (1) 09/01/2021    Lipid Screen  10/23/2025    Cervical cancer screen  10/25/2025    Hepatitis C Screening  Completed    Pneumococcal 0-64 years  Aged Out         Lab Results   Component Value Date/Time    Sodium 138 10/23/2020 12:17 PM    Potassium 3.9 10/23/2020 12:17 PM    Chloride 105 10/23/2020 12:17 PM    CO2 19 (L) 10/23/2020 12:17 PM    Anion gap 14.0 10/23/2020 12:17 PM    Glucose 80 10/23/2020 12:17 PM    BUN 19 10/23/2020 12:17 PM    Creatinine 0.6 10/23/2020 12:17 PM    Calcium 9.2 10/23/2020 12:17 PM     Lab Results   Component Value Date/Time    Cholesterol, total 210 (H) 10/23/2020 12:17 PM    HDL Cholesterol 54 10/23/2020 12:17 PM    LDL, calculated 119 (H) 10/23/2020 12:17 PM    VLDL, calculated 37 (H) 10/23/2020 12:17 PM    Triglyceride 183 (H) 10/23/2020 12:17 PM       Separate service/issue(s) addressed same visit:  follow up asthma - Historically worse in the fall. We had previously discussed sending for evaluation of immunotherapy pending insurance changes, meanwhile continued fluticasone 110, montelukast, albuterol, fexofenadine. Added salmeterol. Ran out of ICS 1 month ago. (admits the bigger issue was she was overwhelmed with work \"underwater\" at school and couldn't recall the pharmacy and didn't \"take the time\" because she was doing so well.)  Stopped allergy medication thinking she didn't need it. Subsequent decline in control. Asthma Control Test 12Yrs Older 11/29/2021 5/14/2021 4/30/2021   In the past 4 weeks, how much of the time did your asthma keep you from getting as much done at work, school, or at home?  5 3 3   During the past 4 weeks how often have you had shortness of breath 4 1 1   During the past 4 weeks often did your asthma symptoms wake up you at night or earlier than usual in the morning 5 5 3   During the past 4 weeks how often have you used your rescue inhaler or nebulizer medication  4 3 3   How would you rate your asthma control during the past 4 weeks 5 3 3   Score 23 15 13         ROS:  Feeling well. No dyspnea or chest pain on exertion. No abdominal pain, change in bowel habits, black or bloody stools. GYN ROS: normal menses, no abnormal bleeding, pelvic pain or discharge, no breast pain or new or enlarging lumps on self exam. No neurological complaints. No new or evolving skin lesions. Objective:     Visit Vitals  /84 (BP 1 Location: Left upper arm, BP Patient Position: Sitting)   Pulse 84   Temp 98 °F (36.7 °C) (Tympanic)   Resp 10   Ht 5' 2\" (1.575 m)   Wt 190 lb 12.8 oz (86.5 kg)   LMP 11/25/2021 (Exact Date)   SpO2 97%   BMI 34.90 kg/m²        The patient appears well, alert, oriented x 3, in no distress. NCAT sclera clear. Neck supple. No adenopathy or thyromegaly. Lungs are clear, good air entry, no wheezes, rhonchi or rales. S1 and S2 normal, no murmurs, regular rate and rhythm. Abdomen soft without tenderness, guarding, mass or organomegaly. Extremities show no edema. Neurological is without focal findings. BREAST EXAM: breasts appear normal, no suspicious masses, no skin or nipple changes or axillary nodes    PELVIC EXAM: examination not indicated      Disclaimer: The patient understands our medical plan. Alternatives have been explained and offered. All questions have been addressed. She is encouraged to employ the information provided in the after visit summary, which was reviewed. She is instructed to call the clinic if she has not been notified either by phone or through [address] with the results of her tests or with an appointment plan for any referrals within 1 week(s). No news is not good news; it's no news. The patient  is to call if her condition worsens or fails to improve or if significant side effects are experienced. Aspects of this note may have been generated using voice recognition software.  Despite editing, unrecognized errors may occur.

## 2021-11-29 NOTE — PROGRESS NOTES
Patient here to be seen for her annual wellness visit as well as f/u on her asthma. She has been out of her Flovent inhaler x 1 month. 1. \"Have you been to the ER, urgent care clinic since your last visit? Hospitalized since your last visit? \" No    2. \"Have you seen or consulted any other health care providers outside of the [address] [place] [address] since your last visit? \" Had laser fat removal in Aug.      3. For patients aged 39-70: Has the patient had a colonoscopy / FIT/ Cologuard? No Never done    If the patient is female:    4. For patients aged 41-77: Has the patient had a mammogram within the past 2 years? No Last done at 41.    5. For patients aged 21-65: Has the patient had a pap smear?  Yes,  satisfied with blue hyperlink

## 2022-03-18 PROBLEM — F41.9 ANXIETY: Status: ACTIVE | Noted: 2020-10-06

## 2022-03-18 PROBLEM — R01.1 HEART MURMUR: Status: ACTIVE | Noted: 2021-05-28

## 2022-03-19 PROBLEM — M47.26 OSTEOARTHRITIS OF SPINE WITH RADICULOPATHY, LUMBAR REGION: Status: ACTIVE | Noted: 2020-10-06

## 2022-03-19 PROBLEM — N28.89 RENAL MASS, LEFT: Status: ACTIVE | Noted: 2021-05-28

## 2022-03-20 PROBLEM — J30.89 NON-SEASONAL ALLERGIC RHINITIS: Status: ACTIVE | Noted: 2021-08-10

## 2022-03-20 PROBLEM — J45.30 MILD PERSISTENT ASTHMA: Status: ACTIVE | Noted: 2021-04-09

## 2022-03-20 PROBLEM — E66.9 OBESITY (BMI 30.0-34.9): Status: ACTIVE | Noted: 2021-11-29

## 2022-03-20 PROBLEM — F33.9 EPISODE OF RECURRENT MAJOR DEPRESSIVE DISORDER (HCC): Status: ACTIVE | Noted: 2020-10-06

## 2022-03-20 PROBLEM — E66.811 OBESITY (BMI 30.0-34.9): Status: ACTIVE | Noted: 2021-11-29

## 2022-05-11 ENCOUNTER — TRANSCRIBE ORDER (OUTPATIENT)
Dept: SCHEDULING | Age: 47
End: 2022-05-11

## 2022-05-14 ENCOUNTER — HOSPITAL ENCOUNTER (OUTPATIENT)
Dept: MAMMOGRAPHY | Age: 47
Discharge: HOME | End: 2022-05-14
Attending: FAMILY MEDICINE
Payer: COMMERCIAL

## 2022-05-14 PROCEDURE — 77067 SCR MAMMO BI INCL CAD: CPT

## 2022-08-08 DIAGNOSIS — J98.01 BRONCHOSPASM: ICD-10-CM

## 2022-08-08 RX ORDER — ALBUTEROL SULFATE 90 UG/1
2 AEROSOL, METERED RESPIRATORY (INHALATION)
Qty: 1 EACH | Refills: 0 | Status: CANCELLED | OUTPATIENT
Start: 2022-08-08

## 2022-08-08 NOTE — TELEPHONE ENCOUNTER
This patient contacted office for the following prescriptions to be filled:    Last office visit: 11/29/21  Follow up appointment: 8/8/22 (if overdue call patient to schedule appointment)  Medication requested : This patient contacted office for the following prescriptions to be filled:  Requested Prescriptions     Pending Prescriptions Disp Refills    albuterol (PROVENTIL HFA, VENTOLIN HFA, PROAIR HFA) 90 mcg/actuation inhaler 1 Each 0     Sig: Take 2 Puffs by inhalation every four (4) hours as needed for Wheezing. PCP: Jodie Leal  Mail order or Local pharmacy name [facility]       Pt states E-Script needs to be sent through [employer].

## 2022-08-08 NOTE — TELEPHONE ENCOUNTER
Patient has a f/u scheduled 22 and is out of her Albuterol inhaler. Called patient  verified told I had gotten her message and Dr. Carlitos Martinez is not in the office this week and I will call in verbal for one inhaler for her. One [facility] center left refill on their voicemail for:  Albuterol inhaler 90 mcg, 2 puffs every 4 hours as needed for wheezing, dispense one with no refills. Office contact info has been left as well.

## 2022-08-30 ENCOUNTER — OFFICE VISIT (OUTPATIENT)
Dept: FAMILY MEDICINE CLINIC | Age: 47
End: 2022-08-30
Payer: COMMERCIAL

## 2022-08-30 VITALS
HEIGHT: 62 IN | BODY MASS INDEX: 37.36 KG/M2 | RESPIRATION RATE: 14 BRPM | OXYGEN SATURATION: 98 % | WEIGHT: 203 LBS | TEMPERATURE: 98.9 F | SYSTOLIC BLOOD PRESSURE: 130 MMHG | DIASTOLIC BLOOD PRESSURE: 78 MMHG | HEART RATE: 81 BPM

## 2022-08-30 DIAGNOSIS — L60.3 DYSTROPHIC NAIL: ICD-10-CM

## 2022-08-30 DIAGNOSIS — J45.40 MODERATE PERSISTENT ASTHMA, UNSPECIFIED WHETHER COMPLICATED: Primary | ICD-10-CM

## 2022-08-30 DIAGNOSIS — F33.42 RECURRENT MAJOR DEPRESSIVE DISORDER, IN FULL REMISSION (HCC): ICD-10-CM

## 2022-08-30 DIAGNOSIS — F33.9 EPISODE OF RECURRENT MAJOR DEPRESSIVE DISORDER, UNSPECIFIED DEPRESSION EPISODE SEVERITY (HCC): ICD-10-CM

## 2022-08-30 PROCEDURE — 99214 OFFICE O/P EST MOD 30 MIN: CPT | Performed by: FAMILY MEDICINE

## 2022-08-30 PROCEDURE — 99001 SPECIMEN HANDLING PT-LAB: CPT

## 2022-08-30 RX ORDER — FLUTICASONE PROPIONATE AND SALMETEROL 500; 50 UG/1; UG/1
1 POWDER RESPIRATORY (INHALATION) EVERY 12 HOURS
Qty: 180 EACH | Refills: 3 | Status: SHIPPED | OUTPATIENT
Start: 2022-08-30

## 2022-08-30 NOTE — PROGRESS NOTES
Chief Complaint   Patient presents with    Asthma     Patient here today for asthma follow up no concerns. 1. \"Have you been to the ER, urgent care clinic since your last visit? Hospitalized since your last visit? \" No    2. \"Have you seen or consulted any other health care providers outside of the [address] [place] [address] since your last visit? \" No     3. For patients aged 39-70: Has the patient had a colonoscopy / FIT/ Cologuard? No      If the patient is female:    4. For patients aged 41-77: Has the patient had a mammogram within the past 2 years? Yes - no Care Gap present      5. For patients aged 21-65: Has the patient had a pap smear?  Yes - no Care Gap present

## 2022-08-30 NOTE — PROGRESS NOTES
Stephany Trammell (: 1975) is a 56 y.o. female, established patient, here for:    ASSESSMENT/PLAN:  1. Mild persistent asthma, unspecified whether complicated  -     fluticasone propion-salmeteroL (ADVAIR/WIXELA) 500-50 mcg/dose diskus inhaler; Take 1 Puff by inhalation every twelve (12) hours. , Normal, Disp-180 Each, R-3  -     REFERRAL TO ALLERGY  2. Episode of recurrent major depressive disorder, unspecified depression episode severity ([address]  3. Dystrophic nail  -     PATHOLOGY SPECIMEN TO LAB; Future    Return in about 3 months (around 2022) for asthma follow up, toenail plan to follow based on results. SUBJECTIVE/OBJECTIVE:  HPI    follow up depression - doing well off SSRI x months    follow up asthma -   Started running [employer] my son who is 8.5 says he can walk faster than I can run\"  Augie Apgar to half marathon - 4 weeks in. Has run 4 miles (run 2 minutes, walk 2 minutes)  Confounds the shortness of breath question but is finding some shortness of breath with walking up stairs   Worse when it's hotter and more humid    Now appreciates allergy to crepe myrtles as well as [place] pear trees    Running 3 days/week    Sometimes using albuterol immediately pre run addresses and averts all resp symptoms   Sometimes having to repeat dosing 2-3x during her run/walk and afterwards      Asthma Control Test 12Yrs Older 2021   In the past 4 weeks, how much of the time did your asthma keep you from getting as much done at work, school, or at home?  4 5 3 3   During the past 4 weeks how often have you had shortness of breath 3 4 1 1   During the past 4 weeks often did your asthma symptoms wake up you at night or earlier than usual in the morning 5 5 5 3   During the past 4 weeks how often have you used your rescue inhaler or nebulizer medication  1 4 3 3   How would you rate your asthma control during the past 4 weeks 4 5 3 3   Score 17 23 15 13       Abnormal toenail      Physical Exam  Constitutional:       General: She is not in acute distress. Appearance: She is well-developed. She is not diaphoretic. HENT:      Head: Normocephalic and atraumatic. Cardiovascular:      Rate and Rhythm: Normal rate and regular rhythm. Heart sounds: Normal heart sounds. No murmur heard. No friction rub. No gallop. Pulmonary:      Effort: Pulmonary effort is normal. No respiratory distress. Breath sounds: Normal breath sounds. No wheezing, rhonchi or rales. Skin:     General: Skin is warm and dry. Neurological:      Mental Status: She is alert and oriented to person, place, and time. Psychiatric:         Behavior: Behavior normal.         Thought Content:  Thought content normal.         Judgment: Judgment normal.             -- Kaylin Hannah MD

## 2022-08-31 ENCOUNTER — HOSPITAL ENCOUNTER (OUTPATIENT)
Dept: LAB | Age: 47
Discharge: HOME | End: 2022-08-30

## 2022-08-31 LAB — XX-LABCORP SPECIMEN COL,LCBCF: NORMAL

## 2022-09-01 PROBLEM — J45.909 MODERATE ASTHMA: Status: ACTIVE | Noted: 2021-04-09

## 2022-09-01 NOTE — ASSESSMENT & PLAN NOTE
Remains in remission off citalopram. Discussed role of perfectionism in triggering anxiety.  Kaden Winn recommended

## 2022-09-12 LAB
CPT DISCLAIMER: NORMAL
DIAGNOSIS SYNOPSIS:: NORMAL
DX ICD CODE: NORMAL
DX ICD CODE: NORMAL
PATH REPORT.COMMENTS IMP SPEC: NORMAL
PATH REPORT.FINAL DX SPEC: NORMAL
PATH REPORT.GROSS SPEC: NORMAL
PATH REPORT.MICROSCOPIC SPEC OTHER STN: NORMAL
PATH REPORT.RELEVANT HX SPEC: NORMAL
PATH REPORT.SITE OF ORIGIN SPEC: NORMAL
PATHOLOGIST NAME: NORMAL
PAYMENT PROCEDURE: NORMAL

## 2022-12-06 ENCOUNTER — OFFICE VISIT (OUTPATIENT)
Dept: FAMILY MEDICINE CLINIC | Age: 47
End: 2022-12-06
Payer: COMMERCIAL

## 2022-12-06 VITALS
SYSTOLIC BLOOD PRESSURE: 130 MMHG | HEART RATE: 78 BPM | TEMPERATURE: 98.7 F | WEIGHT: 198.13 LBS | BODY MASS INDEX: 36.24 KG/M2 | RESPIRATION RATE: 16 BRPM | OXYGEN SATURATION: 93 % | DIASTOLIC BLOOD PRESSURE: 90 MMHG

## 2022-12-06 DIAGNOSIS — J30.89 NON-SEASONAL ALLERGIC RHINITIS, UNSPECIFIED TRIGGER: ICD-10-CM

## 2022-12-06 DIAGNOSIS — Z12.11 SCREENING FOR COLON CANCER: ICD-10-CM

## 2022-12-06 DIAGNOSIS — J45.41 MODERATE PERSISTENT ASTHMA WITH ACUTE EXACERBATION: Primary | ICD-10-CM

## 2022-12-06 PROCEDURE — 99214 OFFICE O/P EST MOD 30 MIN: CPT | Performed by: FAMILY MEDICINE

## 2022-12-06 RX ORDER — PREDNISONE 20 MG/1
TABLET ORAL
Qty: 8 TABLET | Refills: 0 | Status: SHIPPED | OUTPATIENT
Start: 2022-12-06 | End: 2022-12-12

## 2022-12-06 RX ORDER — ALBUTEROL SULFATE 90 UG/1
2 AEROSOL, METERED RESPIRATORY (INHALATION)
Qty: 1 EACH | Refills: 2 | Status: SHIPPED | OUTPATIENT
Start: 2022-12-06

## 2022-12-06 RX ORDER — MONTELUKAST SODIUM 10 MG/1
10 TABLET ORAL DAILY
Qty: 90 TABLET | Refills: 4 | Status: SHIPPED | OUTPATIENT
Start: 2022-12-06

## 2022-12-06 NOTE — PROGRESS NOTES
1. \"Have you been to the ER, urgent care clinic since your last visit? Hospitalized since your last visit? \" Yes Reason for visit: flu    2. \"Have you seen or consulted any other health care providers outside of the [address] [place] [address] since your last visit? \" Yes Where: [facility]      3. For patients aged 39-70: Has the patient had a colonoscopy / FIT/ Cologuard? No      If the patient is female:    4. For patients aged 41-77: Has the patient had a mammogram within the past 2 years? Yes - no Care Gap present      5. For patients aged 21-65: Has the patient had a pap smear? Yes - no Care Gap present      Chief Complaint   Patient presents with    Follow-up   Patient went to Urgent care [facility] and was diagnosed with the Flu.

## 2022-12-06 NOTE — ASSESSMENT & PLAN NOTE
Acute exacerbation due to recent influenza. Short course oral steroids. Schedule albuterol 2 puffs before leave house for work. Repeat during lunch. Continue baseline controller agents: montelukast and high dose Advair. Schedule allergy consultation.

## 2022-12-06 NOTE — PROGRESS NOTES
Vidal Salinas (: 1975) is a 56 y.o. female, established patient, here for:    ASSESSMENT/PLAN:  1. Moderate persistent asthma with acute exacerbation  Assessment & Plan:  Acute exacerbation due to recent influenza. Short course oral steroids. Schedule albuterol 2 puffs before leave house for work. Repeat during lunch. Continue baseline controller agents: montelukast and high dose Advair. Schedule allergy consultation. Orders:  -     predniSONE (DELTASONE) 20 mg tablet; Take 2 Tablets by mouth daily (with breakfast) for 2 days, THEN 1 Tablet daily (with breakfast) for 2 days, THEN 0.5 Tablets daily (with breakfast) for 2 days. , Normal, Disp-8 Tablet, R-0  -     albuterol (PROVENTIL HFA, VENTOLIN HFA, PROAIR HFA) 90 mcg/actuation inhaler; Take 2 Puffs by inhalation every four (4) hours as needed for Wheezing., Normal, Disp-1 Each, R-2  -     montelukast (SINGULAIR) 10 mg tablet; Take 1 Tablet by mouth daily. , Normal, Disp-90 Tablet, R-4  2. Screening for colon cancer  -     REFERRAL TO GASTROENTEROLOGY  3. Non-seasonal allergic rhinitis, unspecified trigger  -     montelukast (SINGULAIR) 10 mg tablet; Take 1 Tablet by mouth daily. , Normal, Disp-90 Tablet, R-4    Return in about 6 months (around 2023). 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 Appearance: She is well-developed. HENT:      Head: Normocephalic and atraumatic. Pulmonary:      Breath sounds:  No wheezing, rhonchi or rales. Comments: Mild increased work of breathing while speaking. Cough with deep breathing  Neurological:      Mental Status: She is alert and oriented to person, place, and time. Psychiatric:         Behavior: Behavior normal.         Thought Content:  Thought content normal.         Judgment: Judgment normal.             -- Neal Mendes MD

## 2023-03-24 RX ORDER — MONTELUKAST SODIUM 10 MG/1
TABLET ORAL
Qty: 90 TABLET | Refills: 4 | OUTPATIENT
Start: 2023-03-24

## 2023-03-24 NOTE — TELEPHONE ENCOUNTER
Patient was seen for her asthma in December with a 6 month follow up plan. Her medication was sent in same day 90 with 4 refills, called patients pharmacy verified they do have active refills and do not need a new prescription sent in.

## 2023-06-20 ENCOUNTER — OFFICE VISIT (OUTPATIENT)
Facility: CLINIC | Age: 48
End: 2023-06-20
Payer: COMMERCIAL

## 2023-06-20 VITALS
HEART RATE: 86 BPM | WEIGHT: 199 LBS | SYSTOLIC BLOOD PRESSURE: 118 MMHG | TEMPERATURE: 98.8 F | OXYGEN SATURATION: 97 % | BODY MASS INDEX: 36.62 KG/M2 | DIASTOLIC BLOOD PRESSURE: 80 MMHG | HEIGHT: 62 IN

## 2023-06-20 DIAGNOSIS — Z23 NEED FOR PROPHYLACTIC VACCINATION AGAINST STREPTOCOCCUS PNEUMONIAE (PNEUMOCOCCUS): ICD-10-CM

## 2023-06-20 DIAGNOSIS — Z12.31 ENCOUNTER FOR SCREENING MAMMOGRAM FOR MALIGNANT NEOPLASM OF BREAST: ICD-10-CM

## 2023-06-20 DIAGNOSIS — J45.41 MODERATE PERSISTENT ASTHMA WITH ACUTE EXACERBATION: Primary | ICD-10-CM

## 2023-06-20 DIAGNOSIS — F33.42 RECURRENT MAJOR DEPRESSIVE DISORDER, IN FULL REMISSION (HCC): ICD-10-CM

## 2023-06-20 DIAGNOSIS — Z12.11 SCREENING FOR COLON CANCER: ICD-10-CM

## 2023-06-20 PROCEDURE — 99214 OFFICE O/P EST MOD 30 MIN: CPT | Performed by: FAMILY MEDICINE

## 2023-06-20 PROCEDURE — 90471 IMMUNIZATION ADMIN: CPT | Performed by: FAMILY MEDICINE

## 2023-06-20 PROCEDURE — 90677 PCV20 VACCINE IM: CPT | Performed by: FAMILY MEDICINE

## 2023-06-20 RX ORDER — FLUTICASONE PROPIONATE AND SALMETEROL 500; 50 UG/1; UG/1
1 POWDER RESPIRATORY (INHALATION) EVERY 12 HOURS
Qty: 3 EACH | Refills: 3 | Status: SHIPPED | OUTPATIENT
Start: 2023-06-20

## 2023-06-20 RX ORDER — MONTELUKAST SODIUM 10 MG/1
10 TABLET ORAL DAILY
Qty: 90 TABLET | Refills: 3 | Status: SHIPPED | OUTPATIENT
Start: 2023-06-20

## 2023-06-20 RX ORDER — CETIRIZINE HYDROCHLORIDE 10 MG/1
10 TABLET ORAL DAILY
COMMUNITY

## 2023-06-20 SDOH — ECONOMIC STABILITY: FOOD INSECURITY: WITHIN THE PAST 12 MONTHS, YOU WORRIED THAT YOUR FOOD WOULD RUN OUT BEFORE YOU GOT MONEY TO BUY MORE.: NEVER TRUE

## 2023-06-20 SDOH — ECONOMIC STABILITY: HOUSING INSECURITY
IN THE LAST 12 MONTHS, WAS THERE A TIME WHEN YOU DID NOT HAVE A STEADY PLACE TO SLEEP OR SLEPT IN A SHELTER (INCLUDING NOW)?: NO

## 2023-06-20 SDOH — ECONOMIC STABILITY: FOOD INSECURITY: WITHIN THE PAST 12 MONTHS, THE FOOD YOU BOUGHT JUST DIDN'T LAST AND YOU DIDN'T HAVE MONEY TO GET MORE.: NEVER TRUE

## 2023-06-20 SDOH — ECONOMIC STABILITY: INCOME INSECURITY: HOW HARD IS IT FOR YOU TO PAY FOR THE VERY BASICS LIKE FOOD, HOUSING, MEDICAL CARE, AND HEATING?: NOT VERY HARD

## 2023-06-20 ASSESSMENT — PATIENT HEALTH QUESTIONNAIRE - PHQ9
2. FEELING DOWN, DEPRESSED OR HOPELESS: 0
6. FEELING BAD ABOUT YOURSELF - OR THAT YOU ARE A FAILURE OR HAVE LET YOURSELF OR YOUR FAMILY DOWN: 0
SUM OF ALL RESPONSES TO PHQ QUESTIONS 1-9: 0
9. THOUGHTS THAT YOU WOULD BE BETTER OFF DEAD, OR OF HURTING YOURSELF: 0
1. LITTLE INTEREST OR PLEASURE IN DOING THINGS: 0
8. MOVING OR SPEAKING SO SLOWLY THAT OTHER PEOPLE COULD HAVE NOTICED. OR THE OPPOSITE, BEING SO FIGETY OR RESTLESS THAT YOU HAVE BEEN MOVING AROUND A LOT MORE THAN USUAL: 0
7. TROUBLE CONCENTRATING ON THINGS, SUCH AS READING THE NEWSPAPER OR WATCHING TELEVISION: 0
4. FEELING TIRED OR HAVING LITTLE ENERGY: 0
SUM OF ALL RESPONSES TO PHQ QUESTIONS 1-9: 0
SUM OF ALL RESPONSES TO PHQ9 QUESTIONS 1 & 2: 0
SUM OF ALL RESPONSES TO PHQ QUESTIONS 1-9: 0
5. POOR APPETITE OR OVEREATING: 0
SUM OF ALL RESPONSES TO PHQ QUESTIONS 1-9: 0
3. TROUBLE FALLING OR STAYING ASLEEP: 0

## 2023-06-20 ASSESSMENT — ASTHMA QUESTIONNAIRES
QUESTION_4 LAST FOUR WEEKS HOW OFTEN HAVE YOU USED YOUR RESCUE INHALER OR NEBULIZER MEDICATION (SUCH AS ALBUTEROL): 5
QUESTION_3 LAST FOUR WEEKS HOW OFTEN DID YOUR ASTHMA SYMPTOMS (WHEEZING, COUGHING, SHORTNESS OF BREATH, CHEST TIGHTNESS OR PAIN) WAKE YOU UP AT NIGHT OR EARLIER THAN USUAL IN THE MORNING: 5
QUESTION_1 LAST FOUR WEEKS HOW MUCH OF THE TIME DID YOUR ASTHMA KEEP YOU FROM GETTING AS MUCH DONE AT WORK, SCHOOL OR AT HOME: 5
QUESTION_5 LAST FOUR WEEKS HOW WOULD YOU RATE YOUR ASTHMA CONTROL: 4
ACT_TOTALSCORE: 21
QUESTION_2 LAST FOUR WEEKS HOW OFTEN HAVE YOU HAD SHORTNESS OF BREATH: 2

## 2023-06-20 NOTE — ASSESSMENT & PLAN NOTE
Continue ICS/LABA, montelukast. Add SABAq4-6 x 2 days. Expect resolution.  Will call for short course steroids if persists

## 2023-06-20 NOTE — PROGRESS NOTES
Chief Complaint   Patient presents with    Depression    Asthma       Patient here today to be seen for her 6 month follow up. 1. \"Have you been to the ER, urgent care clinic since your last visit? Hospitalized since your last visit? \"  [facility] Stomach flu    2. \"Have you seen or consulted any other health care providers outside of the [address] [place] [address] since your last visit? \"  Has seen Podiat[facility], [facility]      3. For patients aged 39-70: Has the patient had a colonoscopy / FIT/ Cologuard? No      If the patient is female:    4. For patients aged 41-77: Has the patient had a mammogram within the past 2 years? Yes - no Care Gap present      5. For patients aged 21-65: Has the patient had a pap smear?  Yes - no Care Gap present

## 2023-06-23 ENCOUNTER — TELEPHONE (OUTPATIENT)
Facility: CLINIC | Age: 48
End: 2023-06-23

## 2023-06-23 DIAGNOSIS — J45.41 MODERATE PERSISTENT ASTHMA WITH ACUTE EXACERBATION: ICD-10-CM

## 2023-06-23 RX ORDER — METHYLPREDNISOLONE 4 MG/1
TABLET ORAL
Qty: 1 KIT | Refills: 0 | Status: SHIPPED | OUTPATIENT
Start: 2023-06-23 | End: 2023-06-29

## 2023-06-23 NOTE — TELEPHONE ENCOUNTER
Called patient  verified she says her breathing had not gotten worse but it had not gotten any better. Patient states she is sure her asthma is being triggered by her cleaning her \"dirty, herman\" house. She is having to use the Albuterol inhaler every 4 hours. This does provide some relief when she uses it. Patient says it was discussed with Dr. Roni Maldonado that an oral steroid would be sent in for her if her breathing had not improved in a couple of days. Pharmacy has been verified with patient please review and advise.

## 2023-06-23 NOTE — TELEPHONE ENCOUNTER
Patient has been notified her medication has been sent in and told to call the office for an appt if she does not improve over the weekend, patient has expressed understanding.

## 2023-06-23 NOTE — TELEPHONE ENCOUNTER
Moderate persistent asthma with acute exacerbation  Assessment & Plan:  No improvement with increased ARBEN. Medrol Dospak   Orders:  -     methylPREDNISolone (MEDROL, BRITTNI,) 4 MG tablet;  Take by mouth., Disp-1 kit, R-0Normal   Follow up next week if not improved over the weekend    Rosalba Peguero MD

## 2023-07-22 ENCOUNTER — HOSPITAL ENCOUNTER (OUTPATIENT)
Facility: HOSPITAL | Age: 48
End: 2023-07-22
Attending: FAMILY MEDICINE
Payer: COMMERCIAL

## 2023-07-22 DIAGNOSIS — Z12.31 ENCOUNTER FOR SCREENING MAMMOGRAM FOR MALIGNANT NEOPLASM OF BREAST: ICD-10-CM

## 2023-07-22 PROCEDURE — 77067 SCR MAMMO BI INCL CAD: CPT

## 2023-09-11 ENCOUNTER — PATIENT MESSAGE (OUTPATIENT)
Facility: CLINIC | Age: 48
End: 2023-09-11

## 2023-09-11 DIAGNOSIS — J18.9 RECURRENT PNEUMONIA: Primary | ICD-10-CM

## 2023-09-23 ENCOUNTER — HOSPITAL ENCOUNTER (OUTPATIENT)
Facility: HOSPITAL | Age: 48
End: 2023-09-23
Payer: COMMERCIAL

## 2023-09-23 DIAGNOSIS — J18.9 RECURRENT PNEUMONIA: ICD-10-CM

## 2023-09-23 PROCEDURE — 71046 X-RAY EXAM CHEST 2 VIEWS: CPT

## 2023-09-26 ENCOUNTER — OFFICE VISIT (OUTPATIENT)
Facility: CLINIC | Age: 48
End: 2023-09-26
Payer: COMMERCIAL

## 2023-09-26 VITALS
WEIGHT: 191.25 LBS | DIASTOLIC BLOOD PRESSURE: 84 MMHG | TEMPERATURE: 98.9 F | HEART RATE: 86 BPM | SYSTOLIC BLOOD PRESSURE: 120 MMHG | BODY MASS INDEX: 34.98 KG/M2 | RESPIRATION RATE: 16 BRPM | OXYGEN SATURATION: 98 %

## 2023-09-26 DIAGNOSIS — Z87.01 HISTORY OF PNEUMONIA: ICD-10-CM

## 2023-09-26 DIAGNOSIS — J45.41 MODERATE PERSISTENT ASTHMA WITH ACUTE EXACERBATION: Primary | ICD-10-CM

## 2023-09-26 PROCEDURE — 99214 OFFICE O/P EST MOD 30 MIN: CPT | Performed by: FAMILY MEDICINE

## 2023-09-26 RX ORDER — PREDNISONE 20 MG/1
TABLET ORAL
Qty: 7 TABLET | Refills: 0 | Status: SHIPPED | OUTPATIENT
Start: 2023-09-26 | End: 2023-10-02

## 2023-09-26 NOTE — ASSESSMENT & PLAN NOTE
Triggered by acute RUL pneumonia. Continue ICS/LABA, montelukast. Short course oral steroids (prednisone taper starting at 40 mg/day), increase albuterol. Reassess status 4-8 weeks. Consider pulm rehab +/- pulm consult.

## 2023-09-26 NOTE — PROGRESS NOTES
Chief Complaint   Patient presents with    Follow-up     Urgent Care. [facility]. Pnuemonia         1. \"Have you been to the ER, urgent care clinic since your last visit? Hospitalized since your last visit? \" Yes Where: [facility]    2. \"Have you seen or consulted any other health care providers outside of the [address] [place] [address] since your last visit? \" No     3. For patients aged 43-73: Has the patient had a colonoscopy / FIT/ Cologuard? No      If the patient is female:    4. For patients aged 43-66: Has the patient had a mammogram within the past 2 years? Yes - no Care Gap present      5. For patients aged 21-65: Has the patient had a pap smear?  Yes - no Care Gap present

## 2023-09-26 NOTE — PROGRESS NOTES
Zari Edwards (: 1975) is a 53 y.o. female, established patient, here for:    ASSESSMENT/PLAN:  1. Moderate persistent asthma with acute exacerbation  Assessment & Plan:  Triggered by acute RUL pneumonia. Continue ICS/LABA, montelukast. Short course oral steroids (prednisone taper starting at 40 mg/day), increase albuterol. Reassess status 4-8 weeks. Consider pulm rehab +/- pulm consult. Orders:  -     predniSONE (DELTASONE) 20 MG tablet; Take 2 tablets by mouth daily for 2 days, THEN 1 tablet daily for 2 days, THEN 0.5 tablets daily for 2 days. , Disp-7 tablet, R-0Normal  -     Spacer/Aero-Holding Chambers JOSE; DAILY PRN Starting 2023, Disp-1 each, R-0, Normal  2. History of pneumonia  Comments:  not recurrent. Clinically resolved. Encouraged to schedule colo    Follow-up and Dispositions    Return for pneumonia and asthma exacerbation follow up 4-8 weeks, no labs prior, (30). SUBJECTIVE/OBJECTIVE:  Chief Complaint   Patient presents with    Follow-up     Urgent Care. [facility]. Pnuemonia        Seen UC weekend -10 for fever 104, cough with brown sputum, dx pneumonia. Treated with doxycycline 100 mg x7 days twice daily and prednisone 40 mg/day x 5 days. Returned for no clinical improvement. Tx levofloxacin 750 mg x 5 days, completed 4 days ago. Single image provided through [address] [place] [address] attendant with first visit consistent with right upper lobe infiltrate    Still coughing, but sputum now clear/white    Aside from sputum improvement, cough essentially unchanged    Initial wheezing has resolved  MEHTA persists    Albuterol 1-2/day initially with pneumonia.  Hasn't used it at all this week  Continues with remainder of asthma control regimen    3 lifetime occurences of pneumonia (young child, immed preCOVID (which could have been COVID), now         Xray Result (most recent):  XR CHEST STANDARD TWO VW 2023    Narrative  XR CHEST (2 VW)    Indication: Pneumonia,

## 2023-12-19 PROBLEM — N28.89 RENAL MASS, LEFT: Status: RESOLVED | Noted: 2021-05-28 | Resolved: 2023-12-19

## 2024-01-02 PROBLEM — R73.03 PREDIABETES: Status: ACTIVE | Noted: 2024-01-02

## 2024-01-02 PROBLEM — E78.1 HYPERTRIGLYCERIDEMIA: Status: ACTIVE | Noted: 2024-01-02

## 2024-01-15 DIAGNOSIS — J45.41 MODERATE PERSISTENT ASTHMA WITH ACUTE EXACERBATION: ICD-10-CM

## 2024-01-15 NOTE — TELEPHONE ENCOUNTER
Patient med was last sent in on 6/20/23 3 with 3 refills, patient should have one refill remaining called pharmacy to verify, they are currently closed for the holiday will call back tomorrow.

## 2024-01-22 RX ORDER — FLUTICASONE PROPIONATE AND SALMETEROL 500; 50 UG/1; UG/1
POWDER RESPIRATORY (INHALATION)
Qty: 180 EACH | Refills: 3 | OUTPATIENT
Start: 2024-01-22

## 2024-06-18 ENCOUNTER — OFFICE VISIT (OUTPATIENT)
Facility: CLINIC | Age: 49
End: 2024-06-18
Payer: COMMERCIAL

## 2024-06-18 VITALS
HEART RATE: 90 BPM | DIASTOLIC BLOOD PRESSURE: 80 MMHG | SYSTOLIC BLOOD PRESSURE: 128 MMHG | TEMPERATURE: 98.9 F | BODY MASS INDEX: 36.33 KG/M2 | WEIGHT: 197.4 LBS | OXYGEN SATURATION: 98 % | HEIGHT: 62 IN

## 2024-06-18 DIAGNOSIS — J45.41 MODERATE PERSISTENT ASTHMA WITH ACUTE EXACERBATION: Primary | ICD-10-CM

## 2024-06-18 DIAGNOSIS — Z23 ENCOUNTER FOR IMMUNIZATION: ICD-10-CM

## 2024-06-18 DIAGNOSIS — J30.1 NON-SEASONAL ALLERGIC RHINITIS DUE TO POLLEN: ICD-10-CM

## 2024-06-18 PROBLEM — F33.42 RECURRENT MAJOR DEPRESSIVE DISORDER, IN FULL REMISSION (HCC): Status: RESOLVED | Noted: 2020-10-06 | Resolved: 2024-06-18

## 2024-06-18 PROCEDURE — 99214 OFFICE O/P EST MOD 30 MIN: CPT | Performed by: FAMILY MEDICINE

## 2024-06-18 PROCEDURE — 90471 IMMUNIZATION ADMIN: CPT | Performed by: FAMILY MEDICINE

## 2024-06-18 PROCEDURE — 90715 TDAP VACCINE 7 YRS/> IM: CPT | Performed by: FAMILY MEDICINE

## 2024-06-18 RX ORDER — FLUTICASONE PROPIONATE AND SALMETEROL 500; 50 UG/1; UG/1
1 POWDER RESPIRATORY (INHALATION) EVERY 12 HOURS
Qty: 3 EACH | Refills: 3 | Status: SHIPPED | OUTPATIENT
Start: 2024-06-18

## 2024-06-18 RX ORDER — ALBUTEROL SULFATE 90 UG/1
2 AEROSOL, METERED RESPIRATORY (INHALATION) EVERY 4 HOURS PRN
Qty: 1 EACH | Refills: 3 | Status: SHIPPED | OUTPATIENT
Start: 2024-06-18

## 2024-06-18 RX ORDER — MONTELUKAST SODIUM 10 MG/1
10 TABLET ORAL DAILY
Qty: 90 TABLET | Refills: 3 | Status: SHIPPED | OUTPATIENT
Start: 2024-06-18

## 2024-06-18 RX ORDER — CETIRIZINE HYDROCHLORIDE 10 MG/1
10 TABLET ORAL DAILY
Qty: 90 TABLET | Refills: 3 | Status: SHIPPED | OUTPATIENT
Start: 2024-06-18

## 2024-06-18 ASSESSMENT — PATIENT HEALTH QUESTIONNAIRE - PHQ9
8. MOVING OR SPEAKING SO SLOWLY THAT OTHER PEOPLE COULD HAVE NOTICED. OR THE OPPOSITE, BEING SO FIGETY OR RESTLESS THAT YOU HAVE BEEN MOVING AROUND A LOT MORE THAN USUAL: NOT AT ALL
SUM OF ALL RESPONSES TO PHQ QUESTIONS 1-9: 1
1. LITTLE INTEREST OR PLEASURE IN DOING THINGS: NOT AT ALL
2. FEELING DOWN, DEPRESSED OR HOPELESS: NOT AT ALL
4. FEELING TIRED OR HAVING LITTLE ENERGY: NOT AT ALL
SUM OF ALL RESPONSES TO PHQ QUESTIONS 1-9: 1
SUM OF ALL RESPONSES TO PHQ9 QUESTIONS 1 & 2: 0
7. TROUBLE CONCENTRATING ON THINGS, SUCH AS READING THE NEWSPAPER OR WATCHING TELEVISION: NOT AT ALL
9. THOUGHTS THAT YOU WOULD BE BETTER OFF DEAD, OR OF HURTING YOURSELF: NOT AT ALL
5. POOR APPETITE OR OVEREATING: NOT AT ALL
3. TROUBLE FALLING OR STAYING ASLEEP: SEVERAL DAYS
6. FEELING BAD ABOUT YOURSELF - OR THAT YOU ARE A FAILURE OR HAVE LET YOURSELF OR YOUR FAMILY DOWN: NOT AT ALL
10. IF YOU CHECKED OFF ANY PROBLEMS, HOW DIFFICULT HAVE THESE PROBLEMS MADE IT FOR YOU TO DO YOUR WORK, TAKE CARE OF THINGS AT HOME, OR GET ALONG WITH OTHER PEOPLE: NOT DIFFICULT AT ALL

## 2024-06-18 ASSESSMENT — ASTHMA QUESTIONNAIRES
QUESTION_3 LAST FOUR WEEKS HOW OFTEN DID YOUR ASTHMA SYMPTOMS (WHEEZING, COUGHING, SHORTNESS OF BREATH, CHEST TIGHTNESS OR PAIN) WAKE YOU UP AT NIGHT OR EARLIER THAN USUAL IN THE MORNING: NOT AT ALL
ACT_TOTALSCORE: 22
QUESTION_2 LAST FOUR WEEKS HOW OFTEN HAVE YOU HAD SHORTNESS OF BREATH: ONCE OR TWICE A WEEK
QUESTION_4 LAST FOUR WEEKS HOW OFTEN HAVE YOU USED YOUR RESCUE INHALER OR NEBULIZER MEDICATION (SUCH AS ALBUTEROL): ONCE A WEEK OR LESS
QUESTION_5 LAST FOUR WEEKS HOW WOULD YOU RATE YOUR ASTHMA CONTROL: WELL CONTROLLED
QUESTION_1 LAST FOUR WEEKS HOW MUCH OF THE TIME DID YOUR ASTHMA KEEP YOU FROM GETTING AS MUCH DONE AT WORK, SCHOOL OR AT HOME: NONE OF THE TIME

## 2024-06-18 NOTE — ASSESSMENT & PLAN NOTE
Uncontrolled, in part to challenges with adherence, missing perhaps 25% of second dose Advair and montelukast as well as tailoring her antihistamine regimen to match her son's, rather than to her symptoms.The patient was advised to administer Advair in the morning and dinner. Singulair was suggested to be taken in the morning. Zyrtec was added to the morning regimen. A referral to an allergist was suggested, but the patient declined. The patient was advised to wear a mask in pollen and [place] areas. A tetanus vaccine was administered today.

## 2024-06-18 NOTE — PROGRESS NOTES
Chief Complaint   Patient presents with    Asthma         \"Have you been to the ER, urgent care clinic since your last visit?  Hospitalized since your last visit?\"    YES - When: approximately 2 months ago.  Where and Why: [facility], SOB/cough, ear infection, & fever.    “Have you seen or consulted any other health care providers outside of [facility] since your last visit?”    NO        “Have you had a colorectal cancer screening such as a colonoscopy/FIT/Cologuard?    NO Pt planning to schedule.     No colonoscopy on file  No cologuard on file  No FIT/FOBT on file   No flexible sigmoidoscopy on file         Click Here for Release of Records Request    
patient presents for follow-up of asthma.    The patient experienced a challenging spring due to multiple viral infections that subsequently led to respiratory issues. She sought medical attention for these symptoms, but remains uncertain if her respiratory symptoms were attributed to her asthma, allergies, or fatigue. 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 Her most recent episode of respiratory control occurred in 03/2023. Her current medication regimen includes Advair 500, administered in the morning and at night, although she occasionally misses her evening dose. She also takes Singulair at night, approx 75 to 80 percent of the time. She adheres to a schedule of Zyrtec, prescribed by her allergist, from mid- February through Memorial Day, and has noticed an improvement in her symptoms while on it. She consulted an allergist several years ago for her allergies. Her allergies were severe in the fall, but have since worsened since relocating. She did not experience persistent asthma until the COVID-19 pandemic.        Current Outpatient Medications   Medication Sig Dispense Refill    fluticasone-salmeterol (ADVAIR) 500-50 MCG/ACT AEPB diskus inhaler Inhale 1 puff into the lungs in the morning and 1 puff in the evening. 3 each 3    montelukast (SINGULAIR) 10 MG tablet Take 1 tablet by mouth daily 90 tablet 3    albuterol sulfate HFA (PROVENTIL;VENTOLIN;PROAIR) 108 (90 Base) MCG/ACT inhaler Inhale 2 puffs into the lungs every 4 hours as needed for Wheezing or Shortness of Breath 1 each 3    cetirizine (ZYRTEC) 10 MG tablet Take 1 tablet by mouth daily 90 tablet 3    Spacer/Aero-Holding Chambers JOSE 1 Device by Does not apply route daily as needed (cough, chest tightness or wheeze) 1 each 0    ibuprofen (ADVIL;MOTRIN) 800 MG tablet take 1 tablet by mouth three times a day if needed for pain       No current facility-administered medications for this

## 2024-06-18 NOTE — PATIENT INSTRUCTIONS
Please \"see\" me prior to you next scheduled asthma visit if:    You are having respiratory symptoms (cough, wheezing, shortness of breath) 2x/week or more during the day time or 2x/month at night time,    OR if you need to use your albuterol 2 or more times daily for 2 days in a row.    Patients who are on daily medication year round to for asthma, it is important to have 2 visits dedicated to asthma each year (even when symptoms are well controlled) to reduce the risk of ER visits, hospitalizations and death from asthma.

## 2024-07-15 ENCOUNTER — TRANSCRIBE ORDERS (OUTPATIENT)
Facility: HOSPITAL | Age: 49
End: 2024-07-15

## 2024-07-15 DIAGNOSIS — Z12.31 VISIT FOR SCREENING MAMMOGRAM: Primary | ICD-10-CM

## 2024-08-02 ENCOUNTER — HOSPITAL ENCOUNTER (OUTPATIENT)
Facility: HOSPITAL | Age: 49
Discharge: HOME | End: 2024-08-02
Attending: FAMILY MEDICINE
Payer: COMMERCIAL

## 2024-08-02 VITALS — WEIGHT: 198 LBS | BODY MASS INDEX: 35.08 KG/M2 | HEIGHT: 63 IN

## 2024-08-02 DIAGNOSIS — Z12.31 VISIT FOR SCREENING MAMMOGRAM: ICD-10-CM

## 2024-08-02 PROCEDURE — 77063 BREAST TOMOSYNTHESIS BI: CPT

## 2024-08-07 ENCOUNTER — TELEPHONE (OUTPATIENT)
Facility: CLINIC | Age: 49
End: 2024-08-07

## 2024-08-07 NOTE — TELEPHONE ENCOUNTER
Pt called in this morning stating that she left her rescue inhaler and is currently in [place]. Pt would like to know if  could send her another inhaler to [facility] [place] [id number] [facility], [place] [address] today. Please review and advise as needed.

## 2024-08-07 NOTE — TELEPHONE ENCOUNTER
Albuterol sulfate hfa 108 called in to [facility] [address] [facility], [place] [address] [phone] one no refills, at 2 puffs every 4 hours as needed for SOB or wheezing. Patient has been notified.

## 2024-08-17 SDOH — ECONOMIC STABILITY: FOOD INSECURITY: WITHIN THE PAST 12 MONTHS, THE FOOD YOU BOUGHT JUST DIDN'T LAST AND YOU DIDN'T HAVE MONEY TO GET MORE.: NEVER TRUE

## 2024-08-17 SDOH — ECONOMIC STABILITY: TRANSPORTATION INSECURITY
IN THE PAST 12 MONTHS, HAS LACK OF TRANSPORTATION KEPT YOU FROM MEETINGS, WORK, OR FROM GETTING THINGS NEEDED FOR DAILY LIVING?: NO

## 2024-08-17 SDOH — ECONOMIC STABILITY: FOOD INSECURITY: WITHIN THE PAST 12 MONTHS, YOU WORRIED THAT YOUR FOOD WOULD RUN OUT BEFORE YOU GOT MONEY TO BUY MORE.: NEVER TRUE

## 2024-08-17 SDOH — ECONOMIC STABILITY: INCOME INSECURITY: HOW HARD IS IT FOR YOU TO PAY FOR THE VERY BASICS LIKE FOOD, HOUSING, MEDICAL CARE, AND HEATING?: NOT VERY HARD

## 2024-08-20 ENCOUNTER — OFFICE VISIT (OUTPATIENT)
Facility: CLINIC | Age: 49
End: 2024-08-20
Payer: COMMERCIAL

## 2024-08-20 VITALS
DIASTOLIC BLOOD PRESSURE: 78 MMHG | HEIGHT: 63 IN | OXYGEN SATURATION: 97 % | HEART RATE: 84 BPM | BODY MASS INDEX: 34.62 KG/M2 | TEMPERATURE: 98.9 F | WEIGHT: 195.4 LBS | SYSTOLIC BLOOD PRESSURE: 126 MMHG

## 2024-08-20 DIAGNOSIS — R73.03 PREDIABETES: ICD-10-CM

## 2024-08-20 DIAGNOSIS — Z12.11 SCREENING FOR COLON CANCER: ICD-10-CM

## 2024-08-20 DIAGNOSIS — E78.1 HYPERTRIGLYCERIDEMIA: Primary | ICD-10-CM

## 2024-08-20 DIAGNOSIS — J45.40 MODERATE PERSISTENT ASTHMA WITHOUT COMPLICATION: ICD-10-CM

## 2024-08-20 DIAGNOSIS — E66.9 OBESITY (BMI 30.0-34.9): ICD-10-CM

## 2024-08-20 PROCEDURE — 99214 OFFICE O/P EST MOD 30 MIN: CPT | Performed by: FAMILY MEDICINE

## 2024-08-20 PROCEDURE — G2211 COMPLEX E/M VISIT ADD ON: HCPCS | Performed by: FAMILY MEDICINE

## 2024-08-20 NOTE — ASSESSMENT & PLAN NOTE
Well controlled. Continue present management Advair and montelukast  Zyrtec was added to the morning regimen. Pre-exercise albuterol.  consider using a scarf to warm her breath during colder weather, as this could potentially alleviate her asthma symptoms. Additionally, she was encouraged to incorporate a few minutes of active warm-up, such as walking, prior to her runs to prevent asthma flare-ups.

## 2024-08-20 NOTE — ASSESSMENT & PLAN NOTE
Her lab results from December 2023 also indicated abnormal cholesterol levels with trigs >350. She has been managing her condition through dietary changes and increased physical activity. A fasting blood work was ordered to be completed at her convenience. The results will be communicated via [employer].    Orders:    Lipid Panel; Future    Comprehensive Metabolic Panel; Future

## 2024-08-20 NOTE — PROGRESS NOTES
Joanne Devi (: 1975) is a 49 y.o. female, established patient, here for:    ASSESSMENT/PLAN:  Assessment & Plan  Prediabetes   With obesity. A1c 5.7 in 2023. She has made significant dietary changes, including reducing red meat, eggs, most dairy, and processed sugars. A fasting blood work was ordered to be completed at her convenience. The results will be communicated via [employer].    Orders:    Comprehensive Metabolic Panel; Future    Hemoglobin A1C; Future    Hypertriglyceridemia   Her lab results from 2023 also indicated abnormal cholesterol levels with trigs >350. She has been managing her condition through dietary changes and increased physical activity. A fasting blood work was ordered to be completed at her convenience. The results will be communicated via [employer].    Orders:    Lipid Panel; Future    Comprehensive Metabolic Panel; Future    Moderate persistent asthma without complication  Well controlled. Continue present management Advair and montelukast  Zyrtec was added to the morning regimen. Pre-exercise albuterol.  consider using a scarf to warm her breath during colder weather, as this could potentially alleviate her asthma symptoms. Additionally, she was encouraged to incorporate a few minutes of active warm-up, such as walking, prior to her runs to prevent asthma flare-ups.           Screening for colon cancer       Orders:    Occult Blood Stool Immunoassay; Future                SUBJECTIVE/OBJECTIVE:  Chief Complaint   Patient presents with    Pre-DM    Hyperlipidemia     Patient is here for follow up in previous lab results.       History of Present Illness  The patient presents for evaluation of preDM and hypertriglyceridemia, identified on labs Dec 2023. No meds    She maintains an active lifestyle, engaging in physical activities 6 days a week, with a rest day in between. Her daily routine includes running 4 miles, although today she only managed 2 miles.

## 2024-08-20 NOTE — PROGRESS NOTES
Chief Complaint   Patient presents with    Pre-DM    Hyperlipidemia     Patient is here for follow up in previous lab results.            \"Have you been to the ER, urgent care clinic since your last visit?  Hospitalized since your last visit?\"    NO    “Have you seen or consulted any other health care providers outside of [facility] since your last visit?”    NO        “Have you had a colorectal cancer screening such as a colonoscopy/FIT/Cologuard?    NO    No colonoscopy on file  No cologuard on file  No FIT/FOBT on file   No flexible sigmoidoscopy on file         Click Here for Release of Records Request'

## 2024-08-20 NOTE — ASSESSMENT & PLAN NOTE
With obesity. A1c 5.7 in December 2023. She has made significant dietary changes, including reducing red meat, eggs, most dairy, and processed sugars. A fasting blood work was ordered to be completed at her convenience. The results will be communicated via [employer].    Orders:    Comprehensive Metabolic Panel; Future    Hemoglobin A1C; Future

## 2024-10-19 ENCOUNTER — HOSPITAL ENCOUNTER (OUTPATIENT)
Facility: HOSPITAL | Age: 49
Discharge: HOME | End: 2024-10-22

## 2024-10-19 LAB — LABCORP SPECIMEN COLLECTION: NORMAL

## 2024-10-19 PROCEDURE — 99001 SPECIMEN HANDLING PT-LAB: CPT

## 2024-10-20 LAB
ALBUMIN SERPL-MCNC: 4.4 G/DL (ref 3.9–4.9)
ALP SERPL-CCNC: 72 IU/L (ref 44–121)
ALT SERPL-CCNC: 15 IU/L (ref 0–32)
AST SERPL-CCNC: 16 IU/L (ref 0–40)
BILIRUB SERPL-MCNC: 0.3 MG/DL (ref 0–1.2)
BUN SERPL-MCNC: 13 MG/DL (ref 6–24)
BUN/CREAT SERPL: 17 (ref 9–23)
CALCIUM SERPL-MCNC: 9.1 MG/DL (ref 8.7–10.2)
CHLORIDE SERPL-SCNC: 105 MMOL/L (ref 96–106)
CHOLEST SERPL-MCNC: 206 MG/DL (ref 100–199)
CO2 SERPL-SCNC: 20 MMOL/L (ref 20–29)
CREAT SERPL-MCNC: 0.77 MG/DL (ref 0.57–1)
EGFRCR SERPLBLD CKD-EPI 2021: 95 ML/MIN/1.73
GLOBULIN SER CALC-MCNC: 2.2 G/DL (ref 1.5–4.5)
GLUCOSE SERPL-MCNC: 85 MG/DL (ref 70–99)
HDLC SERPL-MCNC: 65 MG/DL
LDLC SERPL CALC-MCNC: 118 MG/DL (ref 0–99)
POTASSIUM SERPL-SCNC: 4.1 MMOL/L (ref 3.5–5.2)
PROT SERPL-MCNC: 6.6 G/DL (ref 6–8.5)
SODIUM SERPL-SCNC: 140 MMOL/L (ref 134–144)
SPECIMEN STATUS REPORT: NORMAL
TRIGL SERPL-MCNC: 134 MG/DL (ref 0–149)
VLDLC SERPL CALC-MCNC: 23 MG/DL (ref 5–40)

## 2024-10-21 LAB — HBA1C MFR BLD: 5.7 % (ref 4.8–5.6)

## 2025-01-02 ENCOUNTER — HOSPITAL ENCOUNTER (OUTPATIENT)
Facility: HOSPITAL | Age: 50
Setting detail: SPECIMEN
Discharge: HOME | End: 2025-01-05

## 2025-01-02 LAB — LABCORP SPECIMEN COLLECTION: NORMAL

## 2025-01-02 PROCEDURE — 99001 SPECIMEN HANDLING PT-LAB: CPT

## 2025-01-03 LAB — HEMOCCULT STL QL IA: NEGATIVE

## 2025-01-31 ENCOUNTER — HOSPITAL ENCOUNTER (OUTPATIENT)
Facility: HOSPITAL | Age: 50
Setting detail: SPECIMEN
Discharge: HOME | End: 2025-02-03

## 2025-01-31 ENCOUNTER — OFFICE VISIT (OUTPATIENT)
Facility: CLINIC | Age: 50
End: 2025-01-31

## 2025-01-31 VITALS
DIASTOLIC BLOOD PRESSURE: 64 MMHG | SYSTOLIC BLOOD PRESSURE: 122 MMHG | HEART RATE: 88 BPM | WEIGHT: 198.8 LBS | BODY MASS INDEX: 35.22 KG/M2 | OXYGEN SATURATION: 98 % | HEIGHT: 63 IN | TEMPERATURE: 98.5 F

## 2025-01-31 DIAGNOSIS — N76.0 ACUTE VAGINITIS: Primary | ICD-10-CM

## 2025-01-31 LAB — LABCORP SPECIMEN COLLECTION: NORMAL

## 2025-01-31 PROCEDURE — 99001 SPECIMEN HANDLING PT-LAB: CPT

## 2025-01-31 RX ORDER — METRONIDAZOLE 500 MG/1
500 TABLET ORAL 2 TIMES DAILY
Qty: 14 TABLET | Refills: 0 | Status: SHIPPED | OUTPATIENT
Start: 2025-01-31 | End: 2025-02-07

## 2025-01-31 SDOH — ECONOMIC STABILITY: FOOD INSECURITY: WITHIN THE PAST 12 MONTHS, YOU WORRIED THAT YOUR FOOD WOULD RUN OUT BEFORE YOU GOT MONEY TO BUY MORE.: NEVER TRUE

## 2025-01-31 SDOH — ECONOMIC STABILITY: FOOD INSECURITY: WITHIN THE PAST 12 MONTHS, THE FOOD YOU BOUGHT JUST DIDN'T LAST AND YOU DIDN'T HAVE MONEY TO GET MORE.: NEVER TRUE

## 2025-01-31 ASSESSMENT — PATIENT HEALTH QUESTIONNAIRE - PHQ9
SUM OF ALL RESPONSES TO PHQ9 QUESTIONS 1 & 2: 0
10. IF YOU CHECKED OFF ANY PROBLEMS, HOW DIFFICULT HAVE THESE PROBLEMS MADE IT FOR YOU TO DO YOUR WORK, TAKE CARE OF THINGS AT HOME, OR GET ALONG WITH OTHER PEOPLE: NOT DIFFICULT AT ALL
8. MOVING OR SPEAKING SO SLOWLY THAT OTHER PEOPLE COULD HAVE NOTICED. OR THE OPPOSITE, BEING SO FIGETY OR RESTLESS THAT YOU HAVE BEEN MOVING AROUND A LOT MORE THAN USUAL: NOT AT ALL
9. THOUGHTS THAT YOU WOULD BE BETTER OFF DEAD, OR OF HURTING YOURSELF: NOT AT ALL
6. FEELING BAD ABOUT YOURSELF - OR THAT YOU ARE A FAILURE OR HAVE LET YOURSELF OR YOUR FAMILY DOWN: NOT AT ALL
5. POOR APPETITE OR OVEREATING: NOT AT ALL
2. FEELING DOWN, DEPRESSED OR HOPELESS: NOT AT ALL
SUM OF ALL RESPONSES TO PHQ QUESTIONS 1-9: 1
4. FEELING TIRED OR HAVING LITTLE ENERGY: NOT AT ALL
3. TROUBLE FALLING OR STAYING ASLEEP: SEVERAL DAYS
1. LITTLE INTEREST OR PLEASURE IN DOING THINGS: NOT AT ALL
7. TROUBLE CONCENTRATING ON THINGS, SUCH AS READING THE NEWSPAPER OR WATCHING TELEVISION: NOT AT ALL
SUM OF ALL RESPONSES TO PHQ QUESTIONS 1-9: 1

## 2025-01-31 NOTE — PROGRESS NOTES
Chief Complaint   Patient presents with    Menstrual Problem     Patient she has noticed x 5 months she has had vaginal itching and discharge 4 days after her menstrual cycle. She describes the discharge as being yellowish and clumpy that will last for about 4 days. She says she still has some vaginal itching.          \"Have you been to the ER, urgent care clinic since your last visit?  Hospitalized since your last visit?\"    NO    “Have you seen or consulted any other health care providers outside our system since your last visit?”    NO

## 2025-01-31 NOTE — PROGRESS NOTES
Joanne Devi (: 1975) is a 49 y.o. female, established patient, here for:    ASSESSMENT/PLAN:  Acute vaginitis  -     NuSwab Vaginitis Plus (VG+) with Candida (Six Species); Future  -     metroNIDAZOLE (FLAGYL) 500 MG tablet; Take 1 tablet by mouth 2 times daily for 7 days, Disp-14 tablet, R-0Normal     Mixed picture. Most consistent with BV with background mild atrophic changes.     - Advised all-cotton underwear  - consider stopping use of daily pads/liners  - Consider the use of topical estrogen cream externally and internally if symptoms persist/recur  [facility]     Follow-up and Dispositions    Return if symptoms worsen or fail to improve.            SUBJECTIVE/OBJECTIVE:  Chief Complaint   Patient presents with    Vaginal Discharge     Patient she has noticed x 5 months she has had vaginal itching and discharge 4 days after her menstrual cycle. She describes the discharge as being yellowish and clumpy that will last for about 4 days. She says she still has some vaginal itching.         History of Present Illness  The patient presents for evaluation of vaginal itching and discharge.    She has been experiencing post-menstrual vaginal itching and discharge for approximately 5 months. Initially, she attributed these symptoms to a change in her sanitary pad brand, suspecting an allergic reaction. However, the persistence of the symptoms led her to seek medical attention. Her menstrual cycle typically lasts 4 to 5 days, followed by a clear, mucous-like discharge after a 5-day interval. This pattern continues until the onset of her next menstrual cycle. Currently, she experiences severe vaginal itching and a yeast infection-type discharge, described as chunky, cheesy, and yellowish-brown. The itching is so intense that it causes bleeding upon wiping. She also reports a milky, yellowish discharge that is no longer chunky. She does not report any fishy odor. Her menstrual cycle is due

## 2025-02-05 LAB
A VAGINAE DNA VAG QL NAA+PROBE: ABNORMAL SCORE
BVAB2 DNA VAG QL NAA+PROBE: ABNORMAL SCORE
C ALBICANS DNA VAG QL NAA+PROBE: POSITIVE
C GLABRATA DNA VAG QL NAA+PROBE: NEGATIVE
C KRUSEI DNA VAG QL NAA+PROBE: NEGATIVE
C LUSITANIAE DNA VAG QL NAA+PROBE: NEGATIVE
C TRACH DNA SPEC QL NAA+PROBE: NEGATIVE
CANDIDA DNA VAG QL NAA+PROBE: NEGATIVE
MEGA1 DNA VAG QL NAA+PROBE: ABNORMAL SCORE
N GONORRHOEA DNA VAG QL NAA+PROBE: NEGATIVE
SPECIMEN STATUS REPORT: NORMAL
T VAGINALIS DNA VAG QL NAA+PROBE: NEGATIVE

## 2025-02-07 PROBLEM — N76.0 ACUTE VAGINITIS: Status: ACTIVE | Noted: 2025-02-07

## 2025-08-19 ENCOUNTER — HOSPITAL ENCOUNTER (OUTPATIENT)
Facility: HOSPITAL | Age: 50
Discharge: HOME | End: 2025-08-22
Attending: FAMILY MEDICINE
Payer: COMMERCIAL

## 2025-08-19 VITALS — HEIGHT: 63 IN | WEIGHT: 198 LBS | BODY MASS INDEX: 35.08 KG/M2

## 2025-08-19 DIAGNOSIS — Z12.31 OTHER SCREENING MAMMOGRAM: ICD-10-CM

## 2025-08-19 PROCEDURE — 77067 SCR MAMMO BI INCL CAD: CPT
